# Patient Record
Sex: FEMALE | Race: WHITE | Employment: OTHER | URBAN - METROPOLITAN AREA
[De-identification: names, ages, dates, MRNs, and addresses within clinical notes are randomized per-mention and may not be internally consistent; named-entity substitution may affect disease eponyms.]

---

## 2017-09-07 ENCOUNTER — RADIANT APPOINTMENT (OUTPATIENT)
Dept: MAMMOGRAPHY | Facility: CLINIC | Age: 57
End: 2017-09-07
Attending: NURSE PRACTITIONER
Payer: COMMERCIAL

## 2017-09-07 ENCOUNTER — OFFICE VISIT (OUTPATIENT)
Dept: OBGYN | Facility: CLINIC | Age: 57
End: 2017-09-07
Attending: NURSE PRACTITIONER
Payer: COMMERCIAL

## 2017-09-07 VITALS
BODY MASS INDEX: 28.52 KG/M2 | DIASTOLIC BLOOD PRESSURE: 58 MMHG | HEIGHT: 62 IN | SYSTOLIC BLOOD PRESSURE: 102 MMHG | WEIGHT: 155 LBS

## 2017-09-07 DIAGNOSIS — Z01.419 ENCOUNTER FOR GYNECOLOGICAL EXAMINATION WITHOUT ABNORMAL FINDING: Primary | ICD-10-CM

## 2017-09-07 DIAGNOSIS — Z12.31 VISIT FOR SCREENING MAMMOGRAM: ICD-10-CM

## 2017-09-07 DIAGNOSIS — E55.9 VITAMIN D DEFICIENCY: ICD-10-CM

## 2017-09-07 DIAGNOSIS — N84.1 POLYP AT CERVICAL OS: ICD-10-CM

## 2017-09-07 PROCEDURE — 36415 COLL VENOUS BLD VENIPUNCTURE: CPT | Performed by: NURSE PRACTITIONER

## 2017-09-07 PROCEDURE — G0202 SCR MAMMO BI INCL CAD: HCPCS | Mod: TC | Performed by: OBSTETRICS & GYNECOLOGY

## 2017-09-07 PROCEDURE — G0145 SCR C/V CYTO,THINLAYER,RESCR: HCPCS | Performed by: NURSE PRACTITIONER

## 2017-09-07 PROCEDURE — 82306 VITAMIN D 25 HYDROXY: CPT | Performed by: NURSE PRACTITIONER

## 2017-09-07 PROCEDURE — 99396 PREV VISIT EST AGE 40-64: CPT | Performed by: NURSE PRACTITIONER

## 2017-09-07 PROCEDURE — 88305 TISSUE EXAM BY PATHOLOGIST: CPT | Performed by: NURSE PRACTITIONER

## 2017-09-07 PROCEDURE — 87624 HPV HI-RISK TYP POOLED RSLT: CPT | Performed by: NURSE PRACTITIONER

## 2017-09-07 ASSESSMENT — PATIENT HEALTH QUESTIONNAIRE - PHQ9
SUM OF ALL RESPONSES TO PHQ QUESTIONS 1-9: 0
5. POOR APPETITE OR OVEREATING: NOT AT ALL

## 2017-09-07 ASSESSMENT — ANXIETY QUESTIONNAIRES
3. WORRYING TOO MUCH ABOUT DIFFERENT THINGS: NOT AT ALL
IF YOU CHECKED OFF ANY PROBLEMS ON THIS QUESTIONNAIRE, HOW DIFFICULT HAVE THESE PROBLEMS MADE IT FOR YOU TO DO YOUR WORK, TAKE CARE OF THINGS AT HOME, OR GET ALONG WITH OTHER PEOPLE: NOT DIFFICULT AT ALL
2. NOT BEING ABLE TO STOP OR CONTROL WORRYING: NOT AT ALL
5. BEING SO RESTLESS THAT IT IS HARD TO SIT STILL: NOT AT ALL
1. FEELING NERVOUS, ANXIOUS, OR ON EDGE: NOT AT ALL
GAD7 TOTAL SCORE: 0
6. BECOMING EASILY ANNOYED OR IRRITABLE: NOT AT ALL
7. FEELING AFRAID AS IF SOMETHING AWFUL MIGHT HAPPEN: NOT AT ALL

## 2017-09-07 NOTE — PROGRESS NOTES
Tiffanie is a 57 year old No obstetric history on file. female who presents for annual exam.     Besides routine health maintenance, she has no other health concerns today .    HPI:  The patient doesn't have PCP. Pt here today for her annual exam/mammogram. She is feeling well. No complaints. She is not taking Ambien any longer. She has had a very uneventful year. She has been 1.5 years without a menses. She reports minor hotflashes/night sweats and some vaginal dryness with IC but is managed with sexual lubricant.      GYNECOLOGIC HISTORY:    No LMP recorded. Patient is postmenopausal.  Her current contraception method is: vasectomy.  She  reports that she has never smoked. She has never used smokeless tobacco.      Patient is sexually active.  STD testing offered?  Declined  Last PHQ-9 score on record =   PHQ-9 SCORE 9/7/2017   Total Score 0     Last GAD7 score on record =   CHESTER-7 SCORE 9/7/2017   Total Score 0     Alcohol Score = 4    HEALTH MAINTENANCE:  Cholesterol: 01/15/14   Total= 171, Triglycerides=42, HDL=63, JML=404, TSH=0.58  Last Mammo: 04/15/16, Result: normal, Next Mammo: today   Pap:   Lab Results   Component Value Date    PAP NIL 04/15/2016    PAP NIL 04/08/2015      Colonoscopy:  A year ago @ [facility], Result: normal, Next Colonoscopy: 9 years.  Dexa:  04/08/15    Health maintenance updated:  yes    HISTORY:  Obstetric History     No data available          Patient Active Problem List   Diagnosis     Perimenopausal symptoms     Insomnia     Osteopenia     Past Surgical History:   Procedure Laterality Date     EYE SURGERY Bilateral 2014    lasik     SOFT TISSUE SURGERY Left 1992    lipoma on back      Social History   Substance Use Topics     Smoking status: Never Smoker     Smokeless tobacco: Never Used     Alcohol use Yes      Comment: 3/wk      Problem (# of Occurrences) Relation (Name,Age of Onset)    Prostate Cancer (1) Father            Current Outpatient Prescriptions   Medication Sig      "Cholecalciferol (VITAMIN D3 PO) Take 1,000 Units by mouth daily     No current facility-administered medications for this visit.      No Known Allergies    Past medical, surgical, social and family histories were reviewed and updated in EPIC.    ROS:   12 point review of systems negative other than symptoms noted below.    EXAM:  /58  Ht 5' 1.75\" (1.568 m)  Wt 155 lb (70.3 kg)  BMI 28.58 kg/m2   BMI: Body mass index is 28.58 kg/(m^2).    PHYSICAL EXAM:  Constitutional:  Appearance: Well nourished, well developed, alert, in no acute distress  Neck:  Lymph Nodes:  No lymphadenopathy present    Thyroid:  Gland size normal, nontender, no nodules or masses present  on palpation  Chest:  Respiratory Effort:  Breathing unlabored  Cardiovascular:    Heart: Auscultation:  Regular rate, normal rhythm, no murmurs present  Breasts: Inspection of Breasts:  No lymphadenopathy present., Palpation of Breasts and Axillae:  No masses present on palpation, no breast tenderness., Axillary Lymph Nodes:  No lymphadenopathy present. and No nodularity, asymmetry or nipple discharge bilaterally.  Gastrointestinal:   Abdominal Examination:  Abdomen nontender to palpation, tone normal without rigidity or guarding, no masses present, umbilicus without lesions   Liver and Spleen:  No hepatomegaly present, liver nontender to palpation    Hernias:  No hernias present  Lymphatic: Lymph Nodes:  No other lymphadenopathy present  Skin:  General Inspection:  No rashes present, no lesions present, no areas of  discoloration    Genitalia and Groin:  No rashes present, no lesions present, no areas of  discoloration, no masses present  Neurologic/Psychiatric:    Mental Status:  Oriented X3     Pelvic Exam:  External Genitalia:     Normal appearance for age, no discharge present, no tenderness present, no inflammatory lesions present, color normal  Vagina:     Normal vaginal vault without central or paravaginal defects, no discharge present, no " inflammatory lesions present, no masses present  Bladder:     Nontender to palpation  Urethra:   Urethral Body:  Urethra palpation normal, urethra structural support normal   Urethral Meatus:  No erythema or lesions present  Cervix:     Appearance healthy, SMALL 5MM POLYP present AT OS, nontender to palpation, no bleeding present  Uterus:     Uterus: firm, normal sized and nontender, anteverted in position.   Adnexa:     No adnexal tenderness present, no adnexal masses present  Perineum:     Perineum within normal limits, no evidence of trauma, no rashes or skin lesions present  Anus:     Anus within normal limits, no hemorrhoids present  Inguinal Lymph Nodes:     No lymphadenopathy present  Pubic Hair:     Normal pubic hair distribution for age  Genitalia and Groin:     No rashes present, no lesions present, no areas of discoloration, no masses present      COUNSELING:   Special attention given to:        Regular exercise       Healthy diet/nutrition       Colon cancer screening       (Colleen)menopause management    BMI: Body mass index is 28.58 kg/(m^2).  Weight management plan: Discussed healthy diet and exercise guidelines and patient will follow up in 12 months in clinic to re-evaluate.    ASSESSMENT:  57 year old female with satisfactory annual exam.    ICD-10-CM    1. Encounter for gynecological examination without abnormal finding Z01.419 Pap imaged thin layer screen with HPV - recommended age 30 - 65 years (select HPV order below)     HPV High Risk Types DNA Cervical   2. Vitamin D deficiency E55.9 Vitamin D Deficiency   3. Polyp at cervical os N84.1 Surgical pathology exam       PLAN:  Healthy, normal gyn exam. Supplementing vitamin D-will check lab today. Cervical os polyp removed and sent to pathology.    ALTAGRACIA Winslow CNP  INDICATIONS:                                                      Is a pregnancy test required: No.  Was a consent obtained?  Yes    Today's PHQ-2 Score:   PHQ-2 ( 1999  [employer]) 4/8/2015   Q1: Little interest or pleasure in doing things 0   Q2: Feeling down, depressed or hopeless 0   PHQ-2 Score 0       The patient was advised of the risks, benefits and alternatives of the cervical polyp removal procedure and her questions were answered.      PROCEDURE:                                                      A speculum was placed into the vagina and the cervical polyp was identified. Using ring forceps, the polyp was grasped and removed without difficulty.    POST PROCEDURE:                                                      The polyp was sent to pathology for cytologic analysis.  There was bleeding controlled with pressure. The patient tolerated the procedure well and there were no complications. The patient was discharged to home in stable condition.    Patient advised to call the clinic if heavy bleeding occurs.    ALTAGRACIA Winslow CNP

## 2017-09-07 NOTE — NURSING NOTE
Please abstract the following data from this visit with this patient into the appropriate field in Epic:    Colonoscopy done on this date: 01/01/2016 (approximately), by this group: [facility], results were normal.

## 2017-09-07 NOTE — MR AVS SNAPSHOT
"              After Visit Summary   9/7/2017    Tiffanie Castrejon    MRN: [id number]           Patient Information     Date Of Birth          1960        Visit Information        Provider Department      9/7/2017 8:30 AM Lila Darling APRN CNP [facility]        Today's Diagnoses     Encounter for gynecological examination without abnormal finding    -  1    Vitamin D deficiency        Polyp at cervical os           Follow-ups after your visit        Follow-up notes from your care team     Return in about 1 year (around 9/7/2018).      Who to contact     If you have questions or need follow up information about today's clinic visit or your schedule please contact [facility] directly at [phone].  Normal or non-critical lab and imaging results will be communicated to you by Odersunhart, letter or phone within 4 business days after the clinic has received the results. If you do not hear from us within 7 days, please contact the clinic through Odersunhart or phone. If you have a critical or abnormal lab result, we will notify you by phone as soon as possible.  Submit refill requests through [employer]pex[employer] or call your pharmacy and they will forward the refill request to us. Please allow 3 business days for your refill to be completed.          Additional Information About Your Visit        MyChart Information     Apex[employer] lets you send messages to your doctor, view your test results, renew your prescriptions, schedule appointments and more. To sign up, go to [email or web address] . Click on \"Log in\" on the left side of the screen, which will take you to the Welcome page. Then click on \"Sign up Now\" on the right side of the page.     You will be asked to enter the access code listed below, as well as some personal information. Please follow the directions to create your username and password.     Your access code is: [email or web address]  Expires: 12/6/2017  9:05 AM     Your access code " "will  in 90 days. If you need help or a new code, please call your [place] clinic or [phone].        Care EveryWhere ID     This is your Care EveryWhere ID. This could be used by other organizations to access your [facility] medical records  [id number]        Your Vitals Were     Height BMI (Body Mass Index)                5' 1.75\" (1.568 m) 28.58 kg/m2           Blood Pressure from Last 3 Encounters:   17 102/58   04/15/16 110/70   04/08/15 110/62    Weight from Last 3 Encounters:   17 155 lb (70.3 kg)   04/15/16 154 lb (69.9 kg)   04/08/15 155 lb (70.3 kg)              We Performed the Following     HPV High Risk Types DNA Cervical     Pap imaged thin layer screen with HPV - recommended age 30 - 65 years (select HPV order below)     Surgical pathology exam     Vitamin D Deficiency        Primary Care Provider    None Specified       No primary provider on file.        Equal Access to Services     CHACORTA STEIN : Hadbharti Ford, wananda lynn, qaybta kaalrebeca conroy, traci padilla . So [facility] [phone].    ATENCIÓN: Si hila briana, tiene a gunn disposición servicios gratuitos de asistencia lingüística. Llame al [phone].    We comply with applicable federal civil rights laws and [place] laws. We do not discriminate on the basis of race, color, national origin, age, disability sex, sexual orientation or gender identity.            Thank you!     Thank you for choosing [facility] [place]  for your care. Our goal is always to provide you with excellent care. Hearing back from our patients is one way we can continue to improve our services. Please take a few minutes to complete the written survey that you may receive in the mail after your visit with us. Thank you!             Your Updated Medication List - Protect others around you: Learn how to safely use, store and throw away your medicines at www.disposemymeds.org.        "   This list is accurate as of: 9/7/17  9:05 AM.  Always use your most recent med list.                   Brand Name Dispense Instructions for use Diagnosis    VITAMIN D3 PO      Take 1,000 Units by mouth daily

## 2017-09-08 LAB
COPATH REPORT: NORMAL
DEPRECATED CALCIDIOL+CALCIFEROL SERPL-MC: 31 UG/L (ref 20–75)

## 2017-09-08 ASSESSMENT — ANXIETY QUESTIONNAIRES: GAD7 TOTAL SCORE: 0

## 2017-09-11 LAB
COPATH REPORT: NORMAL
PAP: NORMAL

## 2017-09-12 LAB
FINAL DIAGNOSIS: NORMAL
HPV HR 12 DNA CVX QL NAA+PROBE: NEGATIVE
HPV16 DNA SPEC QL NAA+PROBE: NEGATIVE
HPV18 DNA SPEC QL NAA+PROBE: NEGATIVE
SPECIMEN DESCRIPTION: NORMAL

## 2018-09-13 ENCOUNTER — OFFICE VISIT (OUTPATIENT)
Dept: OBGYN | Facility: CLINIC | Age: 58
End: 2018-09-13
Payer: COMMERCIAL

## 2018-09-13 ENCOUNTER — RADIANT APPOINTMENT (OUTPATIENT)
Dept: MAMMOGRAPHY | Facility: CLINIC | Age: 58
End: 2018-09-13
Payer: COMMERCIAL

## 2018-09-13 VITALS
BODY MASS INDEX: 29.44 KG/M2 | HEIGHT: 62 IN | DIASTOLIC BLOOD PRESSURE: 76 MMHG | SYSTOLIC BLOOD PRESSURE: 110 MMHG | HEART RATE: 72 BPM | WEIGHT: 160 LBS

## 2018-09-13 DIAGNOSIS — Z12.31 VISIT FOR SCREENING MAMMOGRAM: ICD-10-CM

## 2018-09-13 DIAGNOSIS — Z01.419 ENCOUNTER FOR GYNECOLOGICAL EXAMINATION WITHOUT ABNORMAL FINDING: Primary | ICD-10-CM

## 2018-09-13 PROCEDURE — G0145 SCR C/V CYTO,THINLAYER,RESCR: HCPCS | Performed by: NURSE PRACTITIONER

## 2018-09-13 PROCEDURE — 87624 HPV HI-RISK TYP POOLED RSLT: CPT | Performed by: NURSE PRACTITIONER

## 2018-09-13 PROCEDURE — 77067 SCR MAMMO BI INCL CAD: CPT | Mod: TC

## 2018-09-13 PROCEDURE — 99396 PREV VISIT EST AGE 40-64: CPT | Performed by: NURSE PRACTITIONER

## 2018-09-13 ASSESSMENT — ANXIETY QUESTIONNAIRES
GAD7 TOTAL SCORE: 0
2. NOT BEING ABLE TO STOP OR CONTROL WORRYING: NOT AT ALL
7. FEELING AFRAID AS IF SOMETHING AWFUL MIGHT HAPPEN: NOT AT ALL
3. WORRYING TOO MUCH ABOUT DIFFERENT THINGS: NOT AT ALL
5. BEING SO RESTLESS THAT IT IS HARD TO SIT STILL: NOT AT ALL
1. FEELING NERVOUS, ANXIOUS, OR ON EDGE: NOT AT ALL
6. BECOMING EASILY ANNOYED OR IRRITABLE: NOT AT ALL

## 2018-09-13 ASSESSMENT — PATIENT HEALTH QUESTIONNAIRE - PHQ9: 5. POOR APPETITE OR OVEREATING: NOT AT ALL

## 2018-09-13 NOTE — MR AVS SNAPSHOT
"              After Visit Summary   2018    Tiffanie Castrejon    MRN: [id number]           Patient Information     Date Of Birth          1960        Visit Information        Provider Department      2018 7:30 AM Lila Darling APRN CNP [facility] [place]        Today's Diagnoses     Encounter for gynecological examination without abnormal finding    -  1       Follow-ups after your visit        Follow-up notes from your care team     Return in about 1 year (around 2019).      Who to contact     If you have questions or need follow up information about today's clinic visit or your schedule please contact [facility] directly at [phone].  Normal or non-critical lab and imaging results will be communicated to you by MyChart, letter or phone within 4 business days after the clinic has received the results. If you do not hear from us within 7 days, please contact the clinic through MyChart or phone. If you have a critical or abnormal lab result, we will notify you by phone as soon as possible.  Submit refill requests through [employer] or call your pharmacy and they will forward the refill request to us. Please allow 3 business days for your refill to be completed.          Additional Information About Your Visit        MyChart Information     [employer] lets you send messages to your doctor, view your test results, renew your prescriptions, schedule appointments and more. To sign up, go to [email or web address] . Click on \"Log in\" on the left side of the screen, which will take you to the Welcome page. Then click on \"Sign up Now\" on the right side of the page.     You will be asked to enter the access code listed below, as well as some personal information. Please follow the directions to create your username and password.     Your access code is: [email or web address]  Expires: 2018  8:12 AM     Your access code will  in 90 days. If you need help or a new " "code, please call your [place] clinic or [phone].        Care EveryWhere ID     This is your Care EveryWhere ID. This could be used by other organizations to access your [facility] medical records  [id number]        Your Vitals Were     Pulse Height BMI (Body Mass Index)             72 5' 2\" (1.575 m) 29.26 kg/m2          Blood Pressure from Last 3 Encounters:   09/13/18 110/76   09/07/17 102/58   04/15/16 110/70    Weight from Last 3 Encounters:   09/13/18 160 lb (72.6 kg)   09/07/17 155 lb (70.3 kg)   04/15/16 154 lb (69.9 kg)              We Performed the Following     HPV High Risk Types DNA Cervical     Pap imaged thin layer screen with HPV - recommended age 30 - 65        Primary Care Provider    None Specified       No primary provider on file.        Equal Access to Services     CHACORTA STEIN : Mihir Ford, samson lynn, cristina conroy, traci padilla . So [facility] [phone].    ATENCIÓN: Si habla español, tiene a ugnn disposición servicios gratuitos de asistencia lingüística. Llame al [phone].    We comply with applicable federal civil rights laws and [place] laws. We do not discriminate on the basis of race, color, national origin, age, disability, sex, sexual orientation, or gender identity.            Thank you!     Thank you for choosing [facility] [place]  for your care. Our goal is always to provide you with excellent care. Hearing back from our patients is one way we can continue to improve our services. Please take a few minutes to complete the written survey that you may receive in the mail after your visit with us. Thank you!             Your Updated Medication List - Protect others around you: Learn how to safely use, store and throw away your medicines at www.disposemymeds.org.          This list is accurate as of 9/13/18  8:12 AM.  Always use your most recent med list.                   Brand Name Dispense " Instructions for use Diagnosis    VITAMIN D3 PO      Take 1,000 Units by mouth daily

## 2018-09-13 NOTE — LETTER
September 21, 2018    Tiffanie Castrejon  [address] [place] [id number]    Dear Tiffanie,  We are happy to inform you that your PAP smear result from 9/13/18 is normal.  We are now able to do a follow up test on PAP smears. The DNA test is for HPV (Human Papilloma Virus). Cervical cancer is closely linked with certain types of HPV. Your results showed no evidence of high risk HPV.  Therefore we recommend you return in 3 years for your next pap smear.  You will still need to return to the clinic every year for an annual exam and other preventive tests.  Please contact the clinic at [phone] with any questions.  Sincerely,    ALTAGRACIA Winslow CNP/  Michelle Schafer, RN, BSN  Pap Tracking

## 2018-09-13 NOTE — PROGRESS NOTES
Tiffanie is a 58 year old No obstetric history on file. female who presents for annual exam.     Besides routine health maintenance, she has no other health concerns today .    HPI:  The patient does not use a PCP.  Pt here today for her annual exam. She is feeling well.   She is concerned about some weight gain.   She has her mammogram today.  Minimal hotflashes/night sweats. No vaginal bleeding.   Using lubricants for vaginal dryness.      GYNECOLOGIC HISTORY:    No LMP recorded. Patient is postmenopausal.  Her current contraception method is: menopause.  She  reports that she has never smoked. She has never used smokeless tobacco.    Patient is sexually active.  STD testing offered?  Declined  Last PHQ-9 score on record =   PHQ-9 SCORE 9/13/2018   Total Score 0     Last GAD7 score on record =   CHESTER-7 SCORE 9/13/2018   Total Score 0     Alcohol Score = 3    HEALTH MAINTENANCE:  Cholesterol: 1/15/14   Total= 171, Triglycerides=42, HDL=63, VYI=526  Cholesterol   Date Value Ref Range Status   01/15/2014 171 115 - 199 mg/dL Final      Last Mammo: one year ago, Result: normal, Next Mammo: today   Pap:   Lab Results   Component Value Date    PAP NILHPV- 09/07/2017    PAP NIL 04/15/2016    PAP NIL 04/08/2015      Colonoscopy:  1/1/16, Result: normal, Next Colonoscopy: 8 years.  Dexa:  2015    Health maintenance updated:  yes    HISTORY:  Obstetric History     No data available          Patient Active Problem List   Diagnosis     Perimenopausal symptoms     Insomnia     Osteopenia     Past Surgical History:   Procedure Laterality Date     EYE SURGERY Bilateral 2014    lasik     SOFT TISSUE SURGERY Left 1992    lipoma on back      Social History   Substance Use Topics     Smoking status: Never Smoker     Smokeless tobacco: Never Used     Alcohol use Yes      Comment: 3/wk      Problem (# of Occurrences) Relation (Name,Age of Onset)    Prostate Cancer (1) Father            Current Outpatient Prescriptions   Medication Sig  "    Cholecalciferol (VITAMIN D3 PO) Take 1,000 Units by mouth daily     No current facility-administered medications for this visit.      No Known Allergies    Past medical, surgical, social and family histories were reviewed and updated in EPIC.    ROS:   12 point review of systems negative other than symptoms noted below.  Genitourinary: Vaginal Dryness  Endocrine: Decreased Libido    EXAM:  /76  Pulse 72  Ht 5' 2\" (1.575 m)  Wt 160 lb (72.6 kg)  BMI 29.26 kg/m2   BMI: Body mass index is 29.26 kg/(m^2).    PHYSICAL EXAM:  Constitutional:  Appearance: Well nourished, well developed, alert, in no acute distress  Neck:  Lymph Nodes:  No lymphadenopathy present    Thyroid:  Gland size normal, nontender, no nodules or masses present  on palpation  Chest:  Respiratory Effort:  Breathing unlabored  Cardiovascular:    Heart: Auscultation:  Regular rate, normal rhythm, no murmurs present  Breasts: Inspection of Breasts:  No lymphadenopathy present., Palpation of Breasts and Axillae:  No masses present on palpation, no breast tenderness., Axillary Lymph Nodes:  No lymphadenopathy present. and No nodularity, asymmetry or nipple discharge bilaterally.  Gastrointestinal:   Abdominal Examination:  Abdomen nontender to palpation, tone normal without rigidity or guarding, no masses present, umbilicus without lesions   Liver and Spleen:  No hepatomegaly present, liver nontender to palpation    Hernias:  No hernias present  Lymphatic: Lymph Nodes:  No other lymphadenopathy present  Skin:  General Inspection:  No rashes present, no lesions present, no areas of  discoloration    Genitalia and Groin:  No rashes present, no lesions present, no areas of  discoloration, no masses present  Neurologic/Psychiatric:    Mental Status:  Oriented X3     Pelvic Exam:  External Genitalia:     Normal appearance for age, no discharge present, no tenderness present, no inflammatory lesions present, color normal  Vagina:     Normal vaginal " vault without central or paravaginal defects, no discharge present, no inflammatory lesions present, no masses present  Bladder:     Nontender to palpation  Urethra:   Urethral Body:  Urethra palpation normal, urethra structural support normal   Urethral Meatus:  No erythema or lesions present  Cervix:     Appearance healthy, no lesions present, nontender to palpation, no bleeding present  Uterus:     Uterus: firm, normal sized and nontender, anteverted in position.   Adnexa:     No adnexal tenderness present, no adnexal masses present  Perineum:     Perineum within normal limits, no evidence of trauma, no rashes or skin lesions present  Anus:     Anus within normal limits, no hemorrhoids present  Inguinal Lymph Nodes:     No lymphadenopathy present  Pubic Hair:     Normal pubic hair distribution for age  Genitalia and Groin:     No rashes present, no lesions present, no areas of discoloration, no masses present      COUNSELING:   Special attention given to:        Regular exercise       Healthy diet/nutrition       Colon cancer screening       (Colleen)menopause management    BMI: Body mass index is 29.26 kg/(m^2).  Weight management plan: Discussed healthy diet and exercise guidelines and patient will follow up in 12 months in clinic to re-evaluate.    ASSESSMENT:  58 year old female with satisfactory annual exam.    ICD-10-CM    1. Encounter for gynecological examination without abnormal finding Z01.419 Pap imaged thin layer screen with HPV - recommended age 30 - 65     HPV High Risk Types DNA Cervical       PLAN:  Overweight female. Discussed weight watchers program. Pap guidelines discussed. No hx of abnormal. Will complete today then space out every 3 years if today's pap is normal.     ALTAGRACIA Winslow CNP

## 2018-09-14 ASSESSMENT — PATIENT HEALTH QUESTIONNAIRE - PHQ9: SUM OF ALL RESPONSES TO PHQ QUESTIONS 1-9: 0

## 2018-09-14 ASSESSMENT — ANXIETY QUESTIONNAIRES: GAD7 TOTAL SCORE: 0

## 2018-09-17 LAB
COPATH REPORT: NORMAL
PAP: NORMAL

## 2018-09-19 LAB
FINAL DIAGNOSIS: NORMAL
HPV HR 12 DNA CVX QL NAA+PROBE: NEGATIVE
HPV16 DNA SPEC QL NAA+PROBE: NEGATIVE
HPV18 DNA SPEC QL NAA+PROBE: NEGATIVE
SPECIMEN DESCRIPTION: NORMAL
SPECIMEN SOURCE CVX/VAG CYTO: NORMAL

## 2018-11-16 ENCOUNTER — TELEPHONE (OUTPATIENT)
Dept: OBGYN | Facility: CLINIC | Age: 58
End: 2018-11-16

## 2018-11-16 NOTE — TELEPHONE ENCOUNTER
We can see if [employer] screens for the genes associated with Inessa Danlos Syndrome if Tiffanie her self want's to be tested or if her daughter wants testing.     Some genes associated with the syndrome are   COL1A1, COL1A2, COL3A1, COL5A1, and COL5A2    We can facilitate testing if they wish. If [employer] doesn't screen for theses, then we would refer to genetic counselor for further workup.

## 2018-11-16 NOTE — TELEPHONE ENCOUNTER
Patient requesting call from Lila RAYGOZA to discuss genetic testing for Inessa Danlo Syndrome, her daughter was recently diagnosed.

## 2018-11-16 NOTE — TELEPHONE ENCOUNTER
Contacted the pt-Her daughter had been clinically dx by 2 different drs. Has not had any genetic testing. The pt is interested in getting tested herself to find out if she is the carrier of the gene.  Pt informed that [facility] and spoke to a genetic counselor and she recommended that the pt remain with the same lab as her daughter- [facility] only checks for one gene as where other labs do more extensive testing.  Also told the pt that we will send a referral to where she would like to be seen at. Suggested that she investigate where her insurance covers the testing. In the past patients have been referred to mercedes Espinoza at the [facility] in [facility]- # [phone].   Pt said that she will contact [facility] to find out her coverage.

## 2019-05-30 ENCOUNTER — OFFICE VISIT (OUTPATIENT)
Dept: OBGYN | Facility: CLINIC | Age: 59
End: 2019-05-30
Payer: COMMERCIAL

## 2019-05-30 VITALS — DIASTOLIC BLOOD PRESSURE: 72 MMHG | BODY MASS INDEX: 27.55 KG/M2 | SYSTOLIC BLOOD PRESSURE: 108 MMHG | WEIGHT: 150.6 LBS

## 2019-05-30 DIAGNOSIS — N64.4 BREAST PAIN: Primary | ICD-10-CM

## 2019-05-30 PROCEDURE — 99214 OFFICE O/P EST MOD 30 MIN: CPT | Performed by: OBSTETRICS & GYNECOLOGY

## 2019-05-30 NOTE — PROGRESS NOTES
SUBJECTIVE:                                                   Tiffanie Castrejon is a 59 year old female who presents to clinic today for the following health issue(s):  Patient presents with:  Breast Pain: Patient states that she has been having left breast pain off and on for several months now.       HPI:  Has always has sensitive breasts, L>R, affected by caffeine.Over last few months more pain in left breast. Though has not drank more lately. Sensitivity is on outer left, dull ache all the time, occ increases. Not aware of triggers. No rash/lumps/discharge. No trauma. No new activity. No medication changes.   Has addressed this in visits in past, but wanted to get checked out before Sept.     No hx HRT. No breast or related cancers in family    Last mammo sept. Never had abnormal.     LMP uncertain, many years ago.     Review of PMH, SocHx, SurHx, FHx, medications completed. Epic updated.      No LMP recorded. Patient is postmenopausal..   Patient is sexually active, .  Using vasectomy for contraception.    reports that she has never smoked. She has never used smokeless tobacco.    STD testing offered?  Declined    Health maintenance updated:  yes    Today's PHQ-2 Score:   PHQ-2 (  [employer]) 2015   Q1: Little interest or pleasure in doing things 0   Q2: Feeling down, depressed or hopeless 0   PHQ-2 Score 0     Today's PHQ-9 Score:   PHQ-9 SCORE 2018   PHQ-9 Total Score 0     Today's CHESTER-7 Score:   CHESTER-7 SCORE 2018   Total Score 0       Problem list and histories reviewed & adjusted, as indicated.  Additional history: as documented.    Patient Active Problem List   Diagnosis     Perimenopausal symptoms     Insomnia     Osteopenia     Past Surgical History:   Procedure Laterality Date     EYE SURGERY Bilateral     lasik     SOFT TISSUE SURGERY Left     lipoma on back      Social History     Tobacco Use     Smoking status: Never Smoker     Smokeless tobacco: Never Used   Substance  Use Topics     Alcohol use: Yes     Comment: 3/wk      Problem (# of Occurrences) Relation (Name,Age of Onset)    Prostate Cancer (1) Father    Squamous cell carcinoma (1) Mother (88)            Current Outpatient Medications   Medication Sig     Cholecalciferol (VITAMIN D3 PO) Take 1,000 Units by mouth daily     No current facility-administered medications for this visit.      No Known Allergies    ROS:  12 point review of systems negative other than symptoms noted below.  Breast: Tenderness    OBJECTIVE:     /72   Wt 68.3 kg (150 lb 9.6 oz)   Breastfeeding? No   BMI 27.55 kg/m    Body mass index is 27.55 kg/m .    Exam:  Constitutional:  Appearance: Well nourished, well developed alert, in no acute distress  Chest:  Respiratory Effort:  Breathing unlabored  Breasts:  Inspection of Breasts:  No lymphadenopathy present; Palpation of Breasts and Axillae: TENDERNESS PRESENT ON UPPER AND OUTER BREAST QUADRANTS, No masses present on palpation on right, LEFT WITH TWO SMALL SPONGY FIBROUS NODULES AT 12 AND 3 AND ARE NOT TENDER TO PT, no breast tenderness Axillary Lymph Nodes:  No lymphadenopathy present  Lymphatic: Lymph Nodes:  No other lymphadenopathy present  Neurologic/Psychiatric:  Mental Status:  Oriented X3        ASSESSMENT/PLAN:                                                        ICD-10-CM    1. Breast pain N64.4 MA Diagnostic Left w/Jamil       -Has hx of breast pain, suspect this is just exacerbation or increased concern of a long standing condition. Small fibrocystic changes palpated are of low concerns. Will get diagnostic imaging of left breast. Will be due for screen of right in Sept. Pt happy with plan. [facility], may see if can be done closer to her work in [place].    Sakina Case Masters, DO  [facility] [place]

## 2019-06-13 ENCOUNTER — HOSPITAL ENCOUNTER (OUTPATIENT)
Dept: MAMMOGRAPHY | Facility: CLINIC | Age: 59
End: 2019-06-13
Attending: OBSTETRICS & GYNECOLOGY
Payer: COMMERCIAL

## 2019-06-13 ENCOUNTER — HOSPITAL ENCOUNTER (OUTPATIENT)
Dept: MAMMOGRAPHY | Facility: CLINIC | Age: 59
Discharge: HOME | End: 2019-06-13
Attending: OBSTETRICS & GYNECOLOGY | Admitting: OBSTETRICS & GYNECOLOGY
Payer: COMMERCIAL

## 2019-06-13 DIAGNOSIS — N64.4 BREAST PAIN: ICD-10-CM

## 2019-06-13 PROCEDURE — G0279 TOMOSYNTHESIS, MAMMO: HCPCS

## 2019-06-13 PROCEDURE — 76642 ULTRASOUND BREAST LIMITED: CPT | Mod: LT

## 2019-08-02 ENCOUNTER — TRANSFERRED RECORDS (OUTPATIENT)
Dept: HEALTH INFORMATION MANAGEMENT | Facility: CLINIC | Age: 59
End: 2019-08-02

## 2020-03-10 ENCOUNTER — HEALTH MAINTENANCE LETTER (OUTPATIENT)
Age: 60
End: 2020-03-10

## 2020-08-19 NOTE — PROGRESS NOTES
Tiffanie is a 60 year old  female who presents for annual exam.     Besides routine health maintenance, she has no other health concerns today .    HPI:  The patient's PCP is  No primary care provider on file..  Pt here today for her annual gyn exam. She is feeling well. Due for dexa this year. Last in .     Mammogram in 2020, will plan dexa same day.     No vag bleeding. No vasomotor symptoms. Using lubricants with IC.     Recently retired. Joined a few non [employer].     Still working on her weight    Due for shingles vaccine.     Her only concern is some rectal bleeding when she has a hard stool.      GYNECOLOGIC HISTORY:    No LMP recorded. Patient is postmenopausal.  Her current contraception method is: vasectomy and menopause.  She  reports that she has never smoked. She has never used smokeless tobacco.    Patient is sexually active.  STD testing offered?  Declined  Last PHQ-9 score on record =   PHQ-9 SCORE 2020   PHQ-9 Total Score 0     Last GAD7 score on record =   CHESTER-7 SCORE 2020   Total Score 0     Alcohol Score = 4    HEALTH MAINTENANCE:  Cholesterol:   Recent Labs   Lab Test 01/15/14   CHOL 171   HDL 63      TRIG 42     Last Mammo: One year ago, Result: Normal, Next Mammo: Due   Pap:   Lab Results   Component Value Date    PAP NIL, HPV- 2018    PAP NIL 2017    PAP NIL 04/15/2016      Colonoscopy:  2016, Result: Normal, Next Colonoscopy: 6 years.  Dexa:  2015  FINDINGS:  Lumbar Spine (L1-L4) Z-score: 1.0  Dual Femur: -0.2      Lumbar (L1-L4) BMD: 1.323   Total Hip Mean BMD: 0.987    Health maintenance updated:  yes    HISTORY:  OB History    Para Term  AB Living   3 0 0 0 0 3   SAB TAB Ectopic Multiple Live Births   0 0 0 0 3      # Outcome Date GA Lbr Ino/2nd Weight Sex Delivery Anes PTL Lv   3             2             1                 Patient Active Problem List   Diagnosis     Perimenopausal symptoms  "    Insomnia     Osteopenia     Past Surgical History:   Procedure Laterality Date     EYE SURGERY Bilateral 2014    lasik     SOFT TISSUE SURGERY Left 1992    lipoma on back      Social History     Tobacco Use     Smoking status: Never Smoker     Smokeless tobacco: Never Used   Substance Use Topics     Alcohol use: Yes     Comment: 3/wk      Problem (# of Occurrences) Relation (Name,Age of Onset)    Prostate Cancer (1) Father    Squamous cell carcinoma (1) Mother (88)            Current Outpatient Medications   Medication Sig     Cholecalciferol (VITAMIN D3 PO) Take 1,000 Units by mouth daily     No current facility-administered medications for this visit.      No Known Allergies    Past medical, surgical, social and family histories were reviewed and updated in EPIC.    ROS:   12 point review of systems negative other than symptoms noted below or in the HPI.  No urinary frequency or dysuria, bladder or kidney problems    EXAM:  /62   Pulse 68   Ht 1.575 m (5' 2\")   Wt 71.9 kg (158 lb 9.6 oz)   Breastfeeding No   BMI 29.01 kg/m     BMI: Body mass index is 29.01 kg/m .    PHYSICAL EXAM:  Constitutional:   Appearance: Well nourished, well developed, alert, in no acute distress  Neck:  Lymph Nodes:  No lymphadenopathy present    Thyroid:  Gland size normal, nontender, no nodules or masses present  on palpation  Chest:  Respiratory Effort:  Breathing unlabored  Cardiovascular:    Heart: Auscultation:  Regular rate, normal rhythm, no murmurs present  Breasts: Inspection of Breasts:  No lymphadenopathy present., Palpation of Breasts and Axillae:  No masses present on palpation, no breast tenderness., Axillary Lymph Nodes:  No lymphadenopathy present. and No nodularity, asymmetry or nipple discharge bilaterally.  Gastrointestinal:   Abdominal Examination:  Abdomen nontender to palpation, tone normal without rigidity or guarding, no masses present, umbilicus without lesions   Liver and Spleen:  No hepatomegaly " present, liver nontender to palpation    Hernias:  No hernias present  Lymphatic: Lymph Nodes:  No other lymphadenopathy present  Skin:  General Inspection:  No rashes present, no lesions present, no areas of  discoloration  Neurologic:    Mental Status:  Oriented X3.  Normal strength and tone, sensory exam                grossly normal, mentation intact and speech normal.    Psychiatric:   Mentation appears normal and affect normal/bright.         Pelvic Exam:  External Genitalia:     Normal appearance for age, no discharge present, no tenderness present, no inflammatory lesions present, color normal  Vagina:     Normal vaginal vault without central or paravaginal defects, no discharge present, no inflammatory lesions present, no masses present  Bladder:     Nontender to palpation  Urethra:   Urethral Body:  Urethra palpation normal, urethra structural support normal   Urethral Meatus:  No erythema or lesions present  Cervix:     Appearance healthy, no lesions present, nontender to palpation, no bleeding present  Uterus:     Uterus: firm, normal sized and nontender, anteverted in position.   Adnexa:     No adnexal tenderness present, no adnexal masses present  Perineum:     Perineum within normal limits, no evidence of trauma, no rashes or skin lesions present  Anus:     Anus within normal limits, no hemorrhoids present  Inguinal Lymph Nodes:     No lymphadenopathy present  Pubic Hair:     Normal pubic hair distribution for age  Genitalia and Groin:     No rashes present, no lesions present, no areas of discoloration, no masses present      COUNSELING:   Special attention given to:        Regular exercise       Healthy diet/nutrition       Osteoporosis Prevention/Bone Health       Colon cancer screening       (Colleen)menopause management    BMI: Body mass index is 29.01 kg/m .  Weight management plan: Discussed healthy diet and exercise guidelines    ASSESSMENT:  60 year old female with satisfactory annual exam.     ICD-10-CM    1. Encounter for gynecological examination without abnormal finding  Z01.419    2. Need for shingles vaccine  Z23 ZOSTER VACCINE RECOMBINANT ADJUVANTED IM NJX     VACCINE ADMINISTRATION, INITIAL   3. Osteopenia, unspecified location  M85.80 DX Hip/Pelvis/Spine       PLAN:  Normal gyn exam. Pap due in 2021. Shingles vaccine given. dexa this fall.     ALTAGRACIA Winslow CNP

## 2020-08-20 ENCOUNTER — OFFICE VISIT (OUTPATIENT)
Dept: OBGYN | Facility: CLINIC | Age: 60
End: 2020-08-20
Payer: COMMERCIAL

## 2020-08-20 VITALS
HEIGHT: 62 IN | BODY MASS INDEX: 29.19 KG/M2 | WEIGHT: 158.6 LBS | SYSTOLIC BLOOD PRESSURE: 100 MMHG | HEART RATE: 68 BPM | DIASTOLIC BLOOD PRESSURE: 62 MMHG

## 2020-08-20 DIAGNOSIS — Z23 NEED FOR SHINGLES VACCINE: ICD-10-CM

## 2020-08-20 DIAGNOSIS — Z01.419 ENCOUNTER FOR GYNECOLOGICAL EXAMINATION WITHOUT ABNORMAL FINDING: Primary | ICD-10-CM

## 2020-08-20 DIAGNOSIS — M85.80 OSTEOPENIA, UNSPECIFIED LOCATION: ICD-10-CM

## 2020-08-20 PROCEDURE — 90471 IMMUNIZATION ADMIN: CPT | Performed by: NURSE PRACTITIONER

## 2020-08-20 PROCEDURE — 99396 PREV VISIT EST AGE 40-64: CPT | Mod: 25 | Performed by: NURSE PRACTITIONER

## 2020-08-20 PROCEDURE — 90750 HZV VACC RECOMBINANT IM: CPT | Performed by: NURSE PRACTITIONER

## 2020-08-20 ASSESSMENT — ANXIETY QUESTIONNAIRES
3. WORRYING TOO MUCH ABOUT DIFFERENT THINGS: NOT AT ALL
IF YOU CHECKED OFF ANY PROBLEMS ON THIS QUESTIONNAIRE, HOW DIFFICULT HAVE THESE PROBLEMS MADE IT FOR YOU TO DO YOUR WORK, TAKE CARE OF THINGS AT HOME, OR GET ALONG WITH OTHER PEOPLE: NOT DIFFICULT AT ALL
7. FEELING AFRAID AS IF SOMETHING AWFUL MIGHT HAPPEN: NOT AT ALL
2. NOT BEING ABLE TO STOP OR CONTROL WORRYING: NOT AT ALL
5. BEING SO RESTLESS THAT IT IS HARD TO SIT STILL: NOT AT ALL
6. BECOMING EASILY ANNOYED OR IRRITABLE: NOT AT ALL
GAD7 TOTAL SCORE: 0
1. FEELING NERVOUS, ANXIOUS, OR ON EDGE: NOT AT ALL

## 2020-08-20 ASSESSMENT — MIFFLIN-ST. JEOR: SCORE: 1242.65

## 2020-08-20 ASSESSMENT — PATIENT HEALTH QUESTIONNAIRE - PHQ9
SUM OF ALL RESPONSES TO PHQ QUESTIONS 1-9: 0
5. POOR APPETITE OR OVEREATING: NOT AT ALL

## 2020-08-20 NOTE — NURSING NOTE
Prior to immunization administration, verified patients identity using patient s name and date of birth. Please see Immunization Activity for additional information.     Screening Questionnaire for Adult Immunization    Are you sick today?   No   Do you have allergies to medications, food, a vaccine component or latex?   No   Have you ever had a serious reaction after receiving a vaccination?   No   Do you have a long-term health problem with heart, lung, kidney, or metabolic disease (e.g., diabetes), asthma, a blood disorder, no spleen, complement component deficiency, a cochlear implant, or a spinal fluid leak?  Are you on long-term aspirin therapy?   No   Do you have cancer, leukemia, HIV/AIDS, or any other immune system problem?   No   Do you have a parent, brother, or sister with an immune system problem?   No   In the past 3 months, have you taken medications that affect  your immune system, such as prednisone, other steroids, or anticancer drugs; drugs for the treatment of rheumatoid arthritis, Crohn s disease, or psoriasis; or have you had radiation treatments?   No   Have you had a seizure, or a brain or other nervous system problem?   No   During the past year, have you received a transfusion of blood or blood    products, or been given immune (gamma) globulin or antiviral drug?   No   For women: Are you pregnant or is there a chance you could become       pregnant during the next month?   No   Have you received any vaccinations in the past 4 weeks?   No     Immunization questionnaire answers were all negative.        Per orders of Lila Darling, injection of Shingrix given by Sakina Cho MA. Patient instructed to remain in clinic for 15 minutes afterwards, and to report any adverse reaction to me immediately.       Screening performed by Sakina Cho MA on 8/20/2020 at 8:05 AM.

## 2020-08-21 ASSESSMENT — ANXIETY QUESTIONNAIRES: GAD7 TOTAL SCORE: 0

## 2020-10-13 DIAGNOSIS — Z23 NEED FOR SHINGLES VACCINE: Primary | ICD-10-CM

## 2020-10-21 ENCOUNTER — ANCILLARY PROCEDURE (OUTPATIENT)
Dept: MAMMOGRAPHY | Facility: CLINIC | Age: 60
End: 2020-10-21
Payer: COMMERCIAL

## 2020-10-21 ENCOUNTER — ALLIED HEALTH/NURSE VISIT (OUTPATIENT)
Dept: NURSING | Facility: CLINIC | Age: 60
End: 2020-10-21
Payer: COMMERCIAL

## 2020-10-21 ENCOUNTER — ANCILLARY PROCEDURE (OUTPATIENT)
Dept: BONE DENSITY | Facility: CLINIC | Age: 60
End: 2020-10-21
Attending: NURSE PRACTITIONER
Payer: COMMERCIAL

## 2020-10-21 DIAGNOSIS — M85.80 OSTEOPENIA, UNSPECIFIED LOCATION: ICD-10-CM

## 2020-10-21 DIAGNOSIS — Z23 NEED FOR SHINGLES VACCINE: ICD-10-CM

## 2020-10-21 DIAGNOSIS — Z12.31 VISIT FOR SCREENING MAMMOGRAM: ICD-10-CM

## 2020-10-21 PROCEDURE — 90471 IMMUNIZATION ADMIN: CPT

## 2020-10-21 PROCEDURE — 77080 DXA BONE DENSITY AXIAL: CPT | Performed by: OBSTETRICS & GYNECOLOGY

## 2020-10-21 PROCEDURE — 99207 PR NO CHARGE NURSE ONLY: CPT

## 2020-10-21 PROCEDURE — 90750 HZV VACC RECOMBINANT IM: CPT

## 2020-10-21 PROCEDURE — 77067 SCR MAMMO BI INCL CAD: CPT | Performed by: RADIOLOGY

## 2020-10-21 NOTE — PROGRESS NOTES
Prior to immunization administration, verified patients identity using patient s name and date of birth. Please see Immunization Activity for additional information.     Screening Questionnaire for Adult Immunization    Are you sick today?   No   Do you have allergies to medications, food, a vaccine component or latex?   No   Have you ever had a serious reaction after receiving a vaccination?   No   Do you have a long-term health problem with heart, lung, kidney, or metabolic disease (e.g., diabetes), asthma, a blood disorder, no spleen, complement component deficiency, a cochlear implant, or a spinal fluid leak?  Are you on long-term aspirin therapy?   No   Do you have cancer, leukemia, HIV/AIDS, or any other immune system problem?   No   Do you have a parent, brother, or sister with an immune system problem?   No   In the past 3 months, have you taken medications that affect  your immune system, such as prednisone, other steroids, or anticancer drugs; drugs for the treatment of rheumatoid arthritis, Crohn s disease, or psoriasis; or have you had radiation treatments?   No   Have you had a seizure, or a brain or other nervous system problem?   No   During the past year, have you received a transfusion of blood or blood    products, or been given immune (gamma) globulin or antiviral drug?   No   For women: Are you pregnant or is there a chance you could become       pregnant during the next month?   No   Have you received any vaccinations in the past 4 weeks?   No     Immunization questionnaire answers were all negative.        Per orders of Lila Darling, injection of Shingrix given by Norma Lackey CMA. Patient instructed to remain in clinic for 15 minutes afterwards, and to report any adverse reaction to me immediately.       Screening performed by Norma Lackey CMA on 10/21/2020 at 9:19 AM.

## 2020-10-23 ENCOUNTER — TELEPHONE (OUTPATIENT)
Dept: OBGYN | Facility: CLINIC | Age: 60
End: 2020-10-23

## 2020-10-23 NOTE — TELEPHONE ENCOUNTER
Called pt and informed her Lila Darling NP is out of the office for the day and returns on Tuesday.  Pt is willing to wait for her return and will expect a call regarding the results from her recent Dexa scan. She never spoke with Dr Stevens previously regarding results.    Routing to ADOLFO Darling to return call.    Lila Saldaña RN on 10/23/2020 at 4:06 PM

## 2020-10-23 NOTE — TELEPHONE ENCOUNTER
Patient said she was supposed to get a call back from Lila to over her Dexa results. In the Dexa results notes it states that patient was given results by Dr. Stevens, but patient said she never discussed with Dr. Stevens. She would like a call back from Lila.

## 2021-10-03 ENCOUNTER — HEALTH MAINTENANCE LETTER (OUTPATIENT)
Age: 61
End: 2021-10-03

## 2021-11-09 ENCOUNTER — OFFICE VISIT (OUTPATIENT)
Dept: OBGYN | Facility: CLINIC | Age: 61
End: 2021-11-09
Payer: COMMERCIAL

## 2021-11-09 VITALS
SYSTOLIC BLOOD PRESSURE: 104 MMHG | WEIGHT: 159.6 LBS | BODY MASS INDEX: 29.37 KG/M2 | DIASTOLIC BLOOD PRESSURE: 64 MMHG | HEART RATE: 62 BPM | HEIGHT: 62 IN

## 2021-11-09 DIAGNOSIS — Z13.21 ENCOUNTER FOR VITAMIN DEFICIENCY SCREENING: ICD-10-CM

## 2021-11-09 DIAGNOSIS — Z01.419 ENCOUNTER FOR GYNECOLOGICAL EXAMINATION WITHOUT ABNORMAL FINDING: Primary | ICD-10-CM

## 2021-11-09 DIAGNOSIS — Z13.29 SCREENING FOR THYROID DISORDER: ICD-10-CM

## 2021-11-09 DIAGNOSIS — Z80.41 FAMILY HISTORY OF MALIGNANT NEOPLASM OF OVARY IN FIRST DEGREE RELATIVE: ICD-10-CM

## 2021-11-09 DIAGNOSIS — Z13.0 SCREENING FOR DISORDER OF BLOOD AND BLOOD-FORMING ORGANS: ICD-10-CM

## 2021-11-09 DIAGNOSIS — Z13.6 SCREENING FOR CARDIOVASCULAR CONDITION: ICD-10-CM

## 2021-11-09 DIAGNOSIS — Z13.1 SCREENING FOR DIABETES MELLITUS: ICD-10-CM

## 2021-11-09 PROCEDURE — 99396 PREV VISIT EST AGE 40-64: CPT | Performed by: NURSE PRACTITIONER

## 2021-11-09 PROCEDURE — G0145 SCR C/V CYTO,THINLAYER,RESCR: HCPCS | Performed by: NURSE PRACTITIONER

## 2021-11-09 PROCEDURE — 87624 HPV HI-RISK TYP POOLED RSLT: CPT | Performed by: NURSE PRACTITIONER

## 2021-11-09 RX ORDER — CALCIUM CARBONATE 500(1250)
1 TABLET ORAL 2 TIMES DAILY
COMMUNITY

## 2021-11-09 ASSESSMENT — PATIENT HEALTH QUESTIONNAIRE - PHQ9: 5. POOR APPETITE OR OVEREATING: NOT AT ALL

## 2021-11-09 ASSESSMENT — ANXIETY QUESTIONNAIRES
6. BECOMING EASILY ANNOYED OR IRRITABLE: NOT AT ALL
5. BEING SO RESTLESS THAT IT IS HARD TO SIT STILL: NOT AT ALL
7. FEELING AFRAID AS IF SOMETHING AWFUL MIGHT HAPPEN: NOT AT ALL
1. FEELING NERVOUS, ANXIOUS, OR ON EDGE: NOT AT ALL
2. NOT BEING ABLE TO STOP OR CONTROL WORRYING: NOT AT ALL
IF YOU CHECKED OFF ANY PROBLEMS ON THIS QUESTIONNAIRE, HOW DIFFICULT HAVE THESE PROBLEMS MADE IT FOR YOU TO DO YOUR WORK, TAKE CARE OF THINGS AT HOME, OR GET ALONG WITH OTHER PEOPLE: NOT DIFFICULT AT ALL
3. WORRYING TOO MUCH ABOUT DIFFERENT THINGS: NOT AT ALL
GAD7 TOTAL SCORE: 0

## 2021-11-09 ASSESSMENT — MIFFLIN-ST. JEOR: SCORE: 1238.22

## 2021-11-09 NOTE — PROGRESS NOTES
Tiffanie is a 61 year old  female who presents for annual exam.     Besides routine health maintenance,  she would like to discuss some genetic testing. Her sister was just diagnosed with stage 4 ovarian cancer.    HPI:  The patient's PCP is No primary care provider on file.. Patient here today for her annual GYN exam and Pap smear.  She has her mammogram scheduled for next month.  She is postmenopausal and reports no vaginal bleeding.  She has no GYN complaints at this time.  She is not on any hormone replacement therapy at this time.    1 level of concern this year is a new diagnosis of stage IV ovarian cancer in her sister.  It is unclear whether her sister is getting genetic testing or not.  Patient has questions surrounding her care going forward with this new development.    She retired last year and is enjoying her new routine.      GYNECOLOGIC HISTORY:    No LMP recorded. Patient is postmenopausal.    Her current contraception method is: vasectomy and menopause.  She  reports that she has never smoked. She has never used smokeless tobacco.    Patient is sexually active.  STD testing offered?  Declined  Last PHQ-9 score on record =   PHQ-9 SCORE 2021   PHQ-9 Total Score 0     Last GAD7 score on record =   CHESTER-7 SCORE 2021   Total Score 0     Alcohol Score = 4    HEALTH MAINTENANCE:  Cholesterol:   Recent Labs   Lab Test 01/15/14  0000   CHOL 171   HDL 63      TRIG 42   Last Mammo: One year ago, Result: Normal, Next Mammo: 2021  Pap:   Lab Results   Component Value Date    PAP NIL, HPV- 2018    PAP NIL 2017    PAP NIL 04/15/2016      Colonoscopy:  , Result: Normal, Next Colonoscopy: 5 years.  Dexa:  10/21/2020    Health maintenance updated:  yes    HISTORY:  OB History    Para Term  AB Living   3 0 0 0 0 3   SAB IAB Ectopic Multiple Live Births   0 0 0 0 3      # Outcome Date GA Lbr Ino/2nd Weight Sex Delivery Anes PTL Lv   3             2     "         1                 Patient Active Problem List   Diagnosis     Perimenopausal symptoms     Insomnia     Osteopenia     Past Surgical History:   Procedure Laterality Date     EYE SURGERY Bilateral     lasik     SOFT TISSUE SURGERY Left     lipoma on back      Social History     Tobacco Use     Smoking status: Never Smoker     Smokeless tobacco: Never Used   Substance Use Topics     Alcohol use: Yes     Comment: 3/wk      Problem (# of Occurrences) Relation (Name,Age of Onset)    Ovarian Cancer (1) Sister    Prostate Cancer (1) Father    Squamous cell carcinoma (1) Mother (88)            Current Outpatient Medications   Medication Sig     calcium carbonate (OS-LALITHA) 500 MG tablet Take 1 tablet by mouth 2 times daily     Cholecalciferol (VITAMIN D3 PO) Take 1,000 Units by mouth daily     No current facility-administered medications for this visit.     No Known Allergies    Past medical, surgical, social and family histories were reviewed and updated in EPIC.    ROS:   12 point review of systems negative other than symptoms noted below or in the HPI.  No urinary frequency or dysuria, bladder or kidney problems    EXAM:  /64   Pulse 62   Ht 1.568 m (5' 1.75\")   Wt 72.4 kg (159 lb 9.6 oz)   Breastfeeding No   BMI 29.43 kg/m     BMI: Body mass index is 29.43 kg/m .    PHYSICAL EXAM:  Constitutional:   Appearance: Well nourished, well developed, alert, in no acute distress  Neck:  Lymph Nodes:  No lymphadenopathy present    Thyroid:  Gland size normal, nontender, no nodules or masses present  on palpation  Chest:  Respiratory Effort:  Breathing unlabored  Cardiovascular:    Heart: Auscultation:  Regular rate, normal rhythm, no murmurs present  Breasts: Inspection of Breasts:  No lymphadenopathy present., Palpation of Breasts and Axillae:  No masses present on palpation, no breast tenderness., Axillary Lymph Nodes:  No lymphadenopathy present. and No nodularity, asymmetry or nipple discharge " bilaterally.  Gastrointestinal:   Abdominal Examination:  Abdomen nontender to palpation, tone normal without rigidity or guarding, no masses present, umbilicus without lesions   Liver and Spleen:  No hepatomegaly present, liver nontender to palpation    Hernias:  No hernias present  Lymphatic: Lymph Nodes:  No other lymphadenopathy present  Skin:  General Inspection:  No rashes present, no lesions present, no areas of  discoloration  Neurologic:    Mental Status:  Oriented X3.  Normal strength and tone, sensory exam                grossly normal, mentation intact and speech normal.    Psychiatric:   Mentation appears normal and affect normal/bright.         Pelvic Exam:  External Genitalia:     Normal appearance for age, no discharge present, no tenderness present, no inflammatory lesions present, color normal  Vagina:     Normal vaginal vault without central or paravaginal defects, no discharge present, no inflammatory lesions present, no masses present  Bladder:     Nontender to palpation  Urethra:   Urethral Body:  Urethra palpation normal, urethra structural support normal   Urethral Meatus:  No erythema or lesions present  Cervix:     Appearance healthy, no lesions present, nontender to palpation, no bleeding present  Uterus:     Uterus: firm, normal sized and nontender, midplane in position.   Adnexa:     No adnexal tenderness present, no adnexal masses present  Perineum:     Perineum within normal limits, no evidence of trauma, no rashes or skin lesions present  Anus:     Anus within normal limits, no hemorrhoids present  Inguinal Lymph Nodes:     No lymphadenopathy present  Pubic Hair:     Normal pubic hair distribution for age  Genitalia and Groin:     No rashes present, no lesions present, no areas of discoloration, no masses present      COUNSELING:   Special attention given to:        Regular exercise       Healthy diet/nutrition       (Colleen)menopause management    BMI: Body mass index is 29.43  kg/m .  Weight management plan: Discussed healthy diet and exercise guidelines    ASSESSMENT:  61 year old female with satisfactory annual exam.    ICD-10-CM    1. Encounter for gynecological examination without abnormal finding  Z01.419 Pap thin layer screen with HPV - recommended age 30 - 65 years   2. Screening for cardiovascular condition  Z13.6 Lipid panel reflex to direct LDL Fasting   3. Encounter for vitamin deficiency screening  Z13.21 Vitamin D Deficiency   4. Screening for diabetes mellitus  Z13.1 Glucose, whole blood   5. Family history of malignant neoplasm of ovary in first degree relative  Z80.41      US Transvaginal Non OB   6. Screening for thyroid disorder  Z13.29 TSH with free T4 reflex   7. Screening for disorder of blood and blood-forming organs  Z13.0 CBC with platelets       PLAN:  61-year-old female with a normal postmenopausal GYN exam.  Her mammogram is next month.  She is not fasting today but we will have her return to the clinic for fasting labs including a CA-125.  We have also asked her to return to the clinic for a transvaginal ultrasound.  We briefly discussed genetic testing and she will check with her sister to see if she had any testing done.  She is to continue with annual Pap screening.    ALTAGRACIA Winslow CNP

## 2021-11-10 ENCOUNTER — LAB (OUTPATIENT)
Dept: LAB | Facility: CLINIC | Age: 61
End: 2021-11-10
Payer: COMMERCIAL

## 2021-11-10 DIAGNOSIS — Z80.41 FAMILY HISTORY OF MALIGNANT NEOPLASM OF OVARY IN FIRST DEGREE RELATIVE: ICD-10-CM

## 2021-11-10 DIAGNOSIS — Z13.1 SCREENING FOR DIABETES MELLITUS: ICD-10-CM

## 2021-11-10 DIAGNOSIS — Z13.6 SCREENING FOR CARDIOVASCULAR CONDITION: ICD-10-CM

## 2021-11-10 DIAGNOSIS — Z13.0 SCREENING FOR DISORDER OF BLOOD AND BLOOD-FORMING ORGANS: ICD-10-CM

## 2021-11-10 DIAGNOSIS — Z13.21 ENCOUNTER FOR VITAMIN DEFICIENCY SCREENING: ICD-10-CM

## 2021-11-10 DIAGNOSIS — Z13.29 SCREENING FOR THYROID DISORDER: ICD-10-CM

## 2021-11-10 LAB
CANCER AG125 SERPL-ACNC: 10 U/ML (ref 0–30)
CHOLEST SERPL-MCNC: 221 MG/DL
DEPRECATED CALCIDIOL+CALCIFEROL SERPL-MC: 37 UG/L (ref 20–75)
ERYTHROCYTE [DISTWIDTH] IN BLOOD BY AUTOMATED COUNT: 13.9 % (ref 10–15)
FASTING STATUS PATIENT QL REPORTED: YES
GLUCOSE BLD-MCNC: 97 MG/DL (ref 60–99)
HCT VFR BLD AUTO: 43.5 % (ref 35–47)
HDLC SERPL-MCNC: 72 MG/DL
HGB BLD-MCNC: 13.8 G/DL (ref 11.7–15.7)
LDLC SERPL CALC-MCNC: 136 MG/DL
MCH RBC QN AUTO: 27.7 PG (ref 26.5–33)
MCHC RBC AUTO-ENTMCNC: 31.7 G/DL (ref 31.5–36.5)
MCV RBC AUTO: 87 FL (ref 78–100)
NONHDLC SERPL-MCNC: 149 MG/DL
PLATELET # BLD AUTO: 299 10E3/UL (ref 150–450)
RBC # BLD AUTO: 4.99 10E6/UL (ref 3.8–5.2)
TRIGL SERPL-MCNC: 65 MG/DL
TSH SERPL DL<=0.005 MIU/L-ACNC: 0.51 MU/L (ref 0.4–4)
WBC # BLD AUTO: 6.9 10E3/UL (ref 4–11)

## 2021-11-10 PROCEDURE — 82306 VITAMIN D 25 HYDROXY: CPT

## 2021-11-10 PROCEDURE — 86304 IMMUNOASSAY TUMOR CA 125: CPT

## 2021-11-10 PROCEDURE — 80061 LIPID PANEL: CPT

## 2021-11-10 PROCEDURE — 82947 ASSAY GLUCOSE BLOOD QUANT: CPT

## 2021-11-10 PROCEDURE — 84443 ASSAY THYROID STIM HORMONE: CPT

## 2021-11-10 PROCEDURE — 85027 COMPLETE CBC AUTOMATED: CPT

## 2021-11-10 PROCEDURE — 36415 COLL VENOUS BLD VENIPUNCTURE: CPT

## 2021-11-10 ASSESSMENT — PATIENT HEALTH QUESTIONNAIRE - PHQ9: SUM OF ALL RESPONSES TO PHQ QUESTIONS 1-9: 0

## 2021-11-10 ASSESSMENT — ANXIETY QUESTIONNAIRES: GAD7 TOTAL SCORE: 0

## 2021-11-11 LAB
BKR LAB AP GYN ADEQUACY: NORMAL
BKR LAB AP GYN INTERPRETATION: NORMAL
BKR LAB AP HPV REFLEX: NORMAL
BKR LAB AP PREVIOUS ABNORMAL: NORMAL
PATH REPORT.COMMENTS IMP SPEC: NORMAL
PATH REPORT.COMMENTS IMP SPEC: NORMAL
PATH REPORT.RELEVANT HX SPEC: NORMAL

## 2021-11-12 LAB
HUMAN PAPILLOMA VIRUS 16 DNA: NEGATIVE
HUMAN PAPILLOMA VIRUS 18 DNA: NEGATIVE
HUMAN PAPILLOMA VIRUS FINAL DIAGNOSIS: NORMAL
HUMAN PAPILLOMA VIRUS OTHER HR: NEGATIVE

## 2021-11-19 ENCOUNTER — TELEPHONE (OUTPATIENT)
Dept: OBGYN | Facility: CLINIC | Age: 61
End: 2021-11-19
Payer: COMMERCIAL

## 2021-11-19 NOTE — TELEPHONE ENCOUNTER
Patient asked to speak with a nurse. She is wondering if she got a tetanus shot last time she was here? Please return call.

## 2021-11-22 NOTE — PROGRESS NOTES
SUBJECTIVE:                                                   Tiffanie Castrejon is a 61 year old female who presents to clinic today for the following health issue(s):  Patient presents with:  Ultrasound: Family history of malignant neoplasm of ovary in first degree relative       Additional information: F/u ultrasound - family hx ovarian cancer.   11/10/21  =10.    HPI:  Patient here today for transvaginal ultrasound.  She has a family history of sister with ovarian cancer.  Her CA-125 last week was in the normal range at 10.  She would also like to further discussed genetic counseling.    No LMP recorded. Patient is postmenopausal.     Patient is sexually active, .  Using menopause for contraception.    reports that she has never smoked. She has never used smokeless tobacco.    STD testing offered?  Declined    Health maintenance updated:  yes    Today's PHQ-2 Score:   PHQ-2 (  [employer]) 2015   Q1: Little interest or pleasure in doing things 0   Q2: Feeling down, depressed or hopeless 0   PHQ-2 Score 0     Today's PHQ-9 Score:   PHQ-9 SCORE 2021   PHQ-9 Total Score 0     Today's CHESTER-7 Score:   CHESTER-7 SCORE 2021   Total Score 0       Problem list and histories reviewed & adjusted, as indicated.  Additional history: as documented.    Patient Active Problem List   Diagnosis     Perimenopausal symptoms     Insomnia     Osteopenia     Past Surgical History:   Procedure Laterality Date     EYE SURGERY Bilateral     lasik     SOFT TISSUE SURGERY Left 1992    lipoma on back      Social History     Tobacco Use     Smoking status: Never Smoker     Smokeless tobacco: Never Used   Substance Use Topics     Alcohol use: Yes     Comment: 3/wk      Problem (# of Occurrences) Relation (Name,Age of Onset)    Ovarian Cancer (1) Sister    Prostate Cancer (1) Father    Squamous cell carcinoma (1) Mother (88)            Current Outpatient Medications   Medication Sig     calcium carbonate (OS-LALITHA) 500 MG  "tablet Take 1 tablet by mouth 2 times daily     Cholecalciferol (VITAMIN D3 PO) Take 1,000 Units by mouth daily     No current facility-administered medications for this visit.     No Known Allergies    ROS:  12 point review of systems negative other than symptoms noted below or in the HPI.  No urinary frequency or dysuria, bladder or kidney problems      OBJECTIVE:     BP 94/64   Ht 1.568 m (5' 1.75\")   Wt 71.3 kg (157 lb 3.2 oz)   Breastfeeding No   BMI 28.99 kg/m    Body mass index is 28.99 kg/m .    Exam:  Constitutional:  Appearance: Well nourished, well developed alert, in no acute distress  Psychiatric:  Mentation appears normal and affect normal/bright.     In-Clinic Test Results:  Results for orders placed or performed in visit on 11/23/21 (from the past 24 hour(s))   US Transvaginal Non OB    Narrative    Gynecological Ultrasound Report  Pelvic U/S - Transvaginal  [facility]  Referring Provider: Lila Darling NP  Sonographer:  Krysta Muñiz RDMS  Indication: Family history of ovarian cancer  LMP: No LMP recorded. Patient is postmenopausal.  History:   Gynecological Ultrasonography:   Uterus: anteverted. Contour is smooth/regular.  Size: 6.64 x 4.68 x 3.40 cm  Endometrium: Thickness Total 1.98 mm  Findings: Normal  Right Ovary: 1.98 x 1.46 x 1.18 cm. Shadowing right ovarian mass vs   fibroid 1.6x 1.2x 1.2cm  Left Ovary: 1.76 x 1.26 x 1.10 cm. Wnl  Cul de Sac Free Fluid: No free fluid     Impression:        Uterus and left ovary visualized and no abnormalities noted.  Endometrial lining normal.  Right ovary normal.   Small right sided mass noted.  Most consistent with fibroid but cannot   separate from right ovary and therefore cannot rule out ovarian mass.  Consider MRI for further evaluation.  No free fluid.    Opal Wilson MD       ASSESSMENT/PLAN:                                                        ICD-10-CM    1. Family history of malignant neoplasm of ovary in first " degree relative  Z80.41 Other Laboratory; [facility]; [facility] (Laboratory Miscellaneous Order)     MR Pelvis (GYN) wo & w Contrast     Other Laboratory; [facility]; [facility] (Laboratory Miscellaneous Order)   2. Pelvic mass  R19.00 MR Pelvis (GYN) wo & w Contrast       There are no Patient Instructions on file for this visit.    Ultrasound shows a normal uterus and left ovary.  Her uterine lining is thin.  Her right ovary appears normal however there is a small mass noted adjacent to the ovary.  We will have her follow-up with a pelvic MRI and have also discussed myriad my risk genetic testing.    (5 minutes was spent on the date of the encounter doing chart review, review of outside records, review and interpretation of pertinent test results, history and exam, documentation, patient counseling, and further activities as noted above.)      ALTAGRACIA Winslow CNP  [facility] [place]

## 2021-11-23 ENCOUNTER — ANCILLARY PROCEDURE (OUTPATIENT)
Dept: ULTRASOUND IMAGING | Facility: CLINIC | Age: 61
End: 2021-11-23
Attending: NURSE PRACTITIONER
Payer: COMMERCIAL

## 2021-11-23 ENCOUNTER — TELEPHONE (OUTPATIENT)
Dept: OBGYN | Facility: CLINIC | Age: 61
End: 2021-11-23

## 2021-11-23 ENCOUNTER — OFFICE VISIT (OUTPATIENT)
Dept: OBGYN | Facility: CLINIC | Age: 61
End: 2021-11-23
Attending: NURSE PRACTITIONER
Payer: COMMERCIAL

## 2021-11-23 VITALS
DIASTOLIC BLOOD PRESSURE: 64 MMHG | WEIGHT: 157.2 LBS | HEIGHT: 62 IN | BODY MASS INDEX: 28.93 KG/M2 | SYSTOLIC BLOOD PRESSURE: 94 MMHG

## 2021-11-23 DIAGNOSIS — Z23 NEED FOR TDAP VACCINATION: ICD-10-CM

## 2021-11-23 DIAGNOSIS — R19.00 PELVIC MASS: ICD-10-CM

## 2021-11-23 DIAGNOSIS — Z80.41 FAMILY HISTORY OF MALIGNANT NEOPLASM OF OVARY IN FIRST DEGREE RELATIVE: Primary | ICD-10-CM

## 2021-11-23 DIAGNOSIS — Z80.41 FAMILY HISTORY OF MALIGNANT NEOPLASM OF OVARY IN FIRST DEGREE RELATIVE: ICD-10-CM

## 2021-11-23 PROCEDURE — 76830 TRANSVAGINAL US NON-OB: CPT | Performed by: OBSTETRICS & GYNECOLOGY

## 2021-11-23 PROCEDURE — 90471 IMMUNIZATION ADMIN: CPT | Performed by: NURSE PRACTITIONER

## 2021-11-23 PROCEDURE — 36415 COLL VENOUS BLD VENIPUNCTURE: CPT | Performed by: NURSE PRACTITIONER

## 2021-11-23 PROCEDURE — 99000 SPECIMEN HANDLING OFFICE-LAB: CPT | Performed by: NURSE PRACTITIONER

## 2021-11-23 PROCEDURE — 90715 TDAP VACCINE 7 YRS/> IM: CPT | Performed by: NURSE PRACTITIONER

## 2021-11-23 PROCEDURE — 99213 OFFICE O/P EST LOW 20 MIN: CPT | Mod: 25 | Performed by: NURSE PRACTITIONER

## 2021-11-23 ASSESSMENT — MIFFLIN-ST. JEOR: SCORE: 1227.33

## 2021-11-23 NOTE — TELEPHONE ENCOUNTER
Is up for jury duty in the next 3 weeks-of course not sure when she would be needed at this point.    Needs to schedule an MRI and does not want to put this test off due to jury duty.    Requesting a letter to defer jury duty at this time. If approved the letter can be faxed to [phone] it does not need to be sent to a specific person.  Faina Alatorre RN on 11/23/2021 at 11:50 AM

## 2021-11-23 NOTE — LETTER
[facility]  [address] , [address]  [place]   [id number] [phone]        11/23/2021     Tiffanie Websteraydee  [address] [place] [id number]        Tiffanie Castrejon is currently under our care.  It is imperative that she is excused from her impending jury duty.  We would like to defer this duty until a later date.  The patient has some medical needs that need to be addressed and it is imperative that she has these done in a timely manner.      Cordially,    ALTAGRACIA Wnislow CNP

## 2021-12-13 ENCOUNTER — HOSPITAL ENCOUNTER (OUTPATIENT)
Dept: MRI IMAGING | Facility: CLINIC | Age: 61
Discharge: HOME | End: 2021-12-13
Attending: NURSE PRACTITIONER | Admitting: NURSE PRACTITIONER
Payer: COMMERCIAL

## 2021-12-13 DIAGNOSIS — Z80.41 FAMILY HISTORY OF MALIGNANT NEOPLASM OF OVARY IN FIRST DEGREE RELATIVE: ICD-10-CM

## 2021-12-13 DIAGNOSIS — R19.00 PELVIC MASS: ICD-10-CM

## 2021-12-13 PROCEDURE — A9585 GADOBUTROL INJECTION: HCPCS | Performed by: NURSE PRACTITIONER

## 2021-12-13 PROCEDURE — 255N000002 HC RX 255 OP 636: Performed by: NURSE PRACTITIONER

## 2021-12-13 PROCEDURE — 72197 MRI PELVIS W/O & W/DYE: CPT

## 2021-12-13 RX ORDER — GADOBUTROL 604.72 MG/ML
7 INJECTION INTRAVENOUS ONCE
Status: COMPLETED | OUTPATIENT
Start: 2021-12-13 | End: 2021-12-13

## 2021-12-13 RX ADMIN — GADOBUTROL 7 ML: 604.72 INJECTION INTRAVENOUS at 18:20

## 2021-12-14 NOTE — RESULT ENCOUNTER NOTE
Called patient with results. Repeat US in 3 months-pt did not like MRI and would like to defer if possible.

## 2021-12-21 LAB
PERFORMING LABORATORY: NORMAL
SPECIMEN STATUS: NORMAL
TEST NAME: NORMAL

## 2022-03-23 ENCOUNTER — ANCILLARY PROCEDURE (OUTPATIENT)
Dept: MAMMOGRAPHY | Facility: CLINIC | Age: 62
End: 2022-03-23
Attending: NURSE PRACTITIONER
Payer: COMMERCIAL

## 2022-03-23 ENCOUNTER — OFFICE VISIT (OUTPATIENT)
Dept: OBGYN | Facility: CLINIC | Age: 62
End: 2022-03-23
Attending: NURSE PRACTITIONER
Payer: COMMERCIAL

## 2022-03-23 ENCOUNTER — ANCILLARY PROCEDURE (OUTPATIENT)
Dept: ULTRASOUND IMAGING | Facility: CLINIC | Age: 62
End: 2022-03-23
Attending: NURSE PRACTITIONER
Payer: COMMERCIAL

## 2022-03-23 VITALS
WEIGHT: 157.6 LBS | SYSTOLIC BLOOD PRESSURE: 92 MMHG | BODY MASS INDEX: 29 KG/M2 | DIASTOLIC BLOOD PRESSURE: 66 MMHG | HEIGHT: 62 IN

## 2022-03-23 DIAGNOSIS — R19.00 PELVIC MASS: ICD-10-CM

## 2022-03-23 DIAGNOSIS — Z80.41 FAMILY HISTORY OF MALIGNANT NEOPLASM OF OVARY IN FIRST DEGREE RELATIVE: Primary | ICD-10-CM

## 2022-03-23 DIAGNOSIS — Z12.31 OTHER SCREENING MAMMOGRAM: ICD-10-CM

## 2022-03-23 PROCEDURE — 99213 OFFICE O/P EST LOW 20 MIN: CPT | Performed by: NURSE PRACTITIONER

## 2022-03-23 PROCEDURE — 77067 SCR MAMMO BI INCL CAD: CPT | Mod: TC | Performed by: RADIOLOGY

## 2022-03-23 PROCEDURE — 76830 TRANSVAGINAL US NON-OB: CPT | Performed by: OBSTETRICS & GYNECOLOGY

## 2022-03-23 PROCEDURE — 77063 BREAST TOMOSYNTHESIS BI: CPT | Mod: TC | Performed by: RADIOLOGY

## 2022-09-11 ENCOUNTER — HEALTH MAINTENANCE LETTER (OUTPATIENT)
Age: 62
End: 2022-09-11

## 2022-10-12 ENCOUNTER — MYC MEDICAL ADVICE (OUTPATIENT)
Dept: OBGYN | Facility: CLINIC | Age: 62
End: 2022-10-12

## 2022-10-12 ENCOUNTER — PATIENT OUTREACH (OUTPATIENT)
Dept: ONCOLOGY | Facility: CLINIC | Age: 62
End: 2022-10-12

## 2022-10-12 DIAGNOSIS — Z80.41 FAMILY HISTORY OF MALIGNANT NEOPLASM OF OVARY IN FIRST DEGREE RELATIVE: ICD-10-CM

## 2022-10-12 DIAGNOSIS — R19.00 PELVIC MASS: Primary | ICD-10-CM

## 2022-10-12 NOTE — PROGRESS NOTES
"New Patient Hematology / Oncology Nurse Navigator Note     Referral Date: 10/12/22    Referring provider:   Lila Darling, ALTAGRACIA CNP   [address] [place] [facility]   [place] [address]   Phone: [phone]   Fax: [phone]       Referring Clinic/Organization: [facility]     Referred to: GynOnc    Requested provider (if applicable): First available - did not specify     Evaluation for : Pelvic Mass. \"persistent right adnexal mass\"     Clinical History (per Nurse review of records provided):      11/10/21  (normal at 10) -- BOOKMARKED     3/23/22 US showing:  Impression:   Uterus normal with thin endometrial lining.  Left ovary normal.  Right adnexal mass unchanged from prior imaging; fibroid vs right adnexal mass measuring 1.3x1.4x1.1cm.   No free fluid.-- BOOKMARKED    12/13/21 MRI Pelvis showing:                                                IMPRESSION:  There is a 1.1 cm T2 hypointense nodule between the right ovary and  uterus. This nodule is poorly evaluated on T1 imaging due to small  size and remains indeterminate. This most likely represents a small  subserosal fibroid. Recommend follow-up pelvic MRI in 3 months for  further evaluation.    3/22/22 Office Visit with referring provider:   \"Ultrasound today shows a persistent right adnexal mass measuring 1.3 x 1.4 cm.  Her uterus and left ovary are unremarkable.  We did recommend a gyn/oncology consult.\" -- BOOKMARKED    Clinical Assessment / Barriers to Care (Per Nurse):  N/A     Records Location: [facility]     Records Needed:   N/A    Additional testing needed prior to consult:   IB to referring provider asking if willing to order repeat labs/imaging since most recent are March 2022.     Referral updates and Plan:   Will follow-up with patient to discuss plan pending response from referring MD re: repeat labs/imaging    Addendum 10/12 8495: Referring MD agreed to order repeat labs/US. Writer contacted patient to discuss. Will plan to arrange repeat " labs/US and appointment with GynOnc a few days later.     Writer will follow-up pending results of US/labs.     Medina Talley, BSN, RN, PHN, OCN  Hematology/Oncology Nurse Navigator  [facility]  [phone]

## 2022-10-12 NOTE — TELEPHONE ENCOUNTER
3/23/22 OV:Ultrasound today shows a persistent right adnexal mass measuring 1.3 x 1.4 cm.  Her uterus and left ovary are unremarkable.We did recommend a gyn/oncology consult.     Referral t'd up.  Routing to Lila Whiteside CNP to review and advise.    Dolores Collins RN

## 2022-10-14 ENCOUNTER — ANCILLARY PROCEDURE (OUTPATIENT)
Dept: ULTRASOUND IMAGING | Facility: CLINIC | Age: 62
End: 2022-10-14
Attending: NURSE PRACTITIONER
Payer: COMMERCIAL

## 2022-10-14 ENCOUNTER — LAB (OUTPATIENT)
Dept: LAB | Facility: CLINIC | Age: 62
End: 2022-10-14
Payer: COMMERCIAL

## 2022-10-14 DIAGNOSIS — Z80.41 FAMILY HISTORY OF MALIGNANT NEOPLASM OF OVARY IN FIRST DEGREE RELATIVE: ICD-10-CM

## 2022-10-14 DIAGNOSIS — R19.00 PELVIC MASS: ICD-10-CM

## 2022-10-14 LAB — CANCER AG125 SERPL-ACNC: 15 U/ML

## 2022-10-14 PROCEDURE — 86304 IMMUNOASSAY TUMOR CA 125: CPT

## 2022-10-14 PROCEDURE — 76830 TRANSVAGINAL US NON-OB: CPT

## 2022-10-14 PROCEDURE — 36415 COLL VENOUS BLD VENIPUNCTURE: CPT

## 2022-10-17 NOTE — PROGRESS NOTES
10/14/22  (15), 11/10/21  (10)  10/14/22 US showing:   IMPRESSION:  1.  1.3 cm right ovarian nodule with posterior acoustical shadowing. This is stable from prior examinations and likely represents a small fibroma.    Pt scheduled with Dr. Azar 10/26. Reviewed referral/results with Dr. Azar who confirms ok to keep appointment as scheduled since OBGYN is referring patient. Left VM updating patient plan to keep appointment as scheduled. Provided call back number if further questions.

## 2022-10-19 NOTE — TELEPHONE ENCOUNTER
RECORDS STATUS - ALL OTHER DIAGNOSIS      RECORDS RECEIVED FROM: [facility]   DATE RECEIVED: 10/19   NOTES STATUS DETAILS   OFFICE NOTE from referring provider Epic Lila Darling APRN CNP in [facility]: 3/23/22   MEDICATION LIST [facility]    LABS     ANYTHING RELATED TO DIAGNOSIS Epic 10/14/22   GENONOMIC TESTING     TYPE: Epic 11/23/21: MyRisk   IMAGING (NEED IMAGES & REPORT)     MRI PACS 12/13/21: Epic   ULTRASOUND PACS 10/14/22, 3/23/22, 11/23/21: Epic

## 2022-10-24 NOTE — PROGRESS NOTES
Gynecologic Oncology New Patient Consult    Referring provider:    Lila Darling, APRN CNP  [address]  [place] [address]   RE: Tiffanie Castrejon  : 1960  ZAK: 2022      CC: pelvic mass, family history of ovarian cancer    HPI: Ms Tiffanie Castrejon is a 62 year old year old female who presents for consultation. She is here today alone.    She notes a history of a right sided ovarian/uterus mass which was found on imaging she had done given her family history of ovarian cancer. Most recent imaging includes an MRI 2021 and a TVUS 10/2022. TVUS showed a 1.3cm nodule, solid, with shadowing. This was stable from the MRI in December which suggested a uterine vs adnexal mass.     CA-125 on 10/14/22 was normal at 15.     Her sister had ovarian cancer (surgery and chemo and currently on maintenance therapy). Gris herself had negative panel genetic testing, which I reviewed today.     She has not bloating, pain, vaginal bleeding.       Past Medical History:   Diagnosis Date     Hot flashes, menopausal      Insomnia     2/2 stress and menopause     Uterine fibroid        Past Surgical History:   Procedure Laterality Date     EYE SURGERY Bilateral     lasik     SOFT TISSUE SURGERY Left     lipoma on back        OBGYN history and Health Maintenance (Personally reviewed 10/26/2022):   (ages [age])  Last Pap Smear: HR HPVnegative 21  Last Mammogram:3/23/22 BiRads 1  Last Colonoscopy: 2016    Medications and allergies reviewed in EPIC    Social History:  Social History     Tobacco Use     Smoking status: Never     Smokeless tobacco: Never   Substance Use Topics     Alcohol use: Yes     Comment: 3/wk     Work: Retired from non profit  Ethnicity identification: white.   Preferred language: English  Lives at home with:     Family History:   The patient's family history is notable for:  Paternal first cousin with ovarian cancer  Sister with ovarian cancer, treated in   "[place]  Father with prostate cancer  Mother with metastatic SCC    Physical Exam:     /81 (BP Location: Left arm, Patient Position: Sitting, Cuff Size: Adult Large)   Pulse 76   Temp 98.5  F (36.9  C) (Oral)   Resp 20   Ht 1.565 m (5' 1.61\")   Wt 72.3 kg (159 lb 6.4 oz)   SpO2 98%   BMI 29.52 kg/m    Body mass index is 29.52 kg/m .    General: Alert and oriented, no acute distress  Psych: Mood stable. Linear speech, appropriate affect      Remainder of exam deferred    Labs/Imaging (Personally reviewed today,  10/26/2022)    10/14/22:    15    TVUS  10/14/22:    Narrative & Impression   EXAM: US TRANSVAGINAL PELVIC NON-OB  LOCATION: [facility]  DATE/TIME: 10/14/2022 12:08 PM     INDICATION: Pelvic mass, Family history of malignant neoplasm of ovary in first degree relative.  COMPARISON: 03/23/2022, 12/13/2021.  TECHNIQUE: Endovaginal ultrasound was performed to better visualize the adnexa.     FINDINGS:     UTERUS: 7 x 5 x 3 cm. Normal in size and position with no masses.     ENDOMETRIUM: 4 mm. Normal smooth endometrium.     RIGHT OVARY: 2.5 x 1.6 x 1.4 cm. 1.3 cm nodule in the right ovary is hypoechoic with posterior acoustical shadowing.     LEFT OVARY: 2.5 x 1.4 x 1.4 cm. Normal with flow demonstrated.     No significant free fluid.                                                                      IMPRESSION:  1.  1.3 cm right ovarian nodule with posterior acoustical shadowing. This is stable from prior examinations and likely represents a small fibroma.                    MRI 12/13/21:     IMPRESSION:  There is a 1.1 cm T2 hypointense nodule between the right ovary and  uterus. This nodule is poorly evaluated on T1 imaging due to small  size and remains indeterminate. This most likely represents a small  subserosal fibroid. Recommend follow-up pelvic MRI in 3 months for  further evaluation.     TIARRA GILBERT MD       Reviewed full panel testing from 11/2021: [facility]ll " panel testing was all negative (see scanned results from 12/21/2021)     Assessment:    Tiffanie Castrejon is a 62 year old woman with a new diagnosis of an ovarian mass, family history of ovarian cancer.         Plan:     1.)   Pelvic mass and family history of ovarian cancer: Imaging and  are suggestive of a benign right ovarian mass, likely a fibroma. This could be originating from the uterus as well. Chance of malignancy is low, but I explained that the only way to know for sure is surgical excision. We discussed these findings in light of her family history (sister and paternal cousin with ovarian cancer). We reviewed that about 20% of advanced ovarian cancer is genetic. With negative panel testing it is unlikely she has a risk above baseline, however there are likely some clusters of cancer in families that we have not yet identified the potential genetic mechanism. I reviewed that prophylactic surgery is not recommended given her negative testing, however given she does have a finding on TVUS, it certainly would be reasonable to perform a BSO (I do not feel a hysterectomy is indicated) . We reviewed how there is no screening test for ovarian cancer.   She would like to consider options and will get back to us. If she would like to move forward would plan laparoscopic BSO. Discussed surgical risks not limited to bleeding, infection, damage to surrounding organs, need for blood transfusions and ICU stay.     -Possible BSO  -Will not need any further preoperative testing except a H&P/pre-op from PCP     2.)Genetic risk factors were assessed: Has had panel testing        Total visit time today was 60 minutes, which included review of labs, personally reviewing images , reviewing outside records, face to face time with the patient,  Documentation.      Dianne Azar MD    Department of Ob/Gyn and Women's Health  Division of Gynecologic Oncology  [facility]  Pager [phone]

## 2022-10-26 ENCOUNTER — TELEPHONE (OUTPATIENT)
Dept: ONCOLOGY | Facility: CLINIC | Age: 62
End: 2022-10-26

## 2022-10-26 ENCOUNTER — PRE VISIT (OUTPATIENT)
Dept: ONCOLOGY | Facility: CLINIC | Age: 62
End: 2022-10-26

## 2022-10-26 ENCOUNTER — ONCOLOGY VISIT (OUTPATIENT)
Dept: ONCOLOGY | Facility: CLINIC | Age: 62
End: 2022-10-26
Attending: NURSE PRACTITIONER
Payer: COMMERCIAL

## 2022-10-26 VITALS
HEART RATE: 76 BPM | SYSTOLIC BLOOD PRESSURE: 120 MMHG | OXYGEN SATURATION: 98 % | WEIGHT: 159.4 LBS | BODY MASS INDEX: 29.33 KG/M2 | DIASTOLIC BLOOD PRESSURE: 81 MMHG | TEMPERATURE: 98.5 F | RESPIRATION RATE: 20 BRPM | HEIGHT: 62 IN

## 2022-10-26 DIAGNOSIS — Z80.41 FAMILY HISTORY OF MALIGNANT NEOPLASM OF OVARY IN FIRST DEGREE RELATIVE: ICD-10-CM

## 2022-10-26 DIAGNOSIS — R19.00 PELVIC MASS: ICD-10-CM

## 2022-10-26 PROCEDURE — G0463 HOSPITAL OUTPT CLINIC VISIT: HCPCS

## 2022-10-26 PROCEDURE — 99205 OFFICE O/P NEW HI 60 MIN: CPT | Performed by: OBSTETRICS & GYNECOLOGY

## 2022-10-26 ASSESSMENT — PAIN SCALES - GENERAL: PAINLEVEL: NO PAIN (0)

## 2022-10-26 NOTE — PROGRESS NOTES
"Oncology Rooming Note    October 26, 2022 10:26 AM   Tiffanie Castrejon is a 62 year old female who presents for:    Chief Complaint   Patient presents with     Oncology Clinic Visit     Initial Vitals: /81 (BP Location: Left arm, Patient Position: Sitting, Cuff Size: Adult Large)   Pulse 76   Temp 98.5  F (36.9  C) (Oral)   Resp 20   Ht 1.565 m (5' 1.61\")   Wt 72.3 kg (159 lb 6.4 oz)   SpO2 98%   BMI 29.52 kg/m   Estimated body mass index is 29.52 kg/m  as calculated from the following:    Height as of this encounter: 1.565 m (5' 1.61\").    Weight as of this encounter: 72.3 kg (159 lb 6.4 oz). Body surface area is 1.77 meters squared.  No Pain (0) Comment: Data Unavailable   No LMP recorded. Patient is postmenopausal.  Allergies reviewed: Yes  Medications reviewed: Yes    Medications: Medication refills not needed today.  Pharmacy name entered into [facility]:    [facility] #[id number] - [address]  WRITTEN PRESCRIPTION REQUESTED  [id number] - [place], [place] - [address]    Clinical concerns: no      Mary Macias CMA              "

## 2022-10-26 NOTE — LETTER
10/26/2022         RE: Tiffanie Castrejon  [address] [facility] [address]        Dear Colleague,    Thank you for referring your patient, Tiffanie Castrejon, to the [facility]. Please see a copy of my visit note below.    Gynecologic Oncology New Patient Consult    Referring provider:    Lila Darling, APRN CNP  [address]  [place] [address]   RE: Tiffanie Castrejon  : 1960  ZAK: 2022      CC: pelvic mass, family history of ovarian cancer    HPI: Ms Tiffanie Castrejon is a 62 year old year old female who presents for consultation. She is here today alone.    She notes a history of a right sided ovarian/uterus mass which was found on imaging she had done given her family history of ovarian cancer. Most recent imaging includes an MRI 2021 and a TVUS 10/2022. TVUS showed a 1.3cm nodule, solid, with shadowing. This was stable from the MRI in December which suggested a uterine vs adnexal mass.     CA-125 on 10/14/22 was normal at 15.     Her sister had ovarian cancer (surgery and chemo and currently on maintenance therapy). Gris herself had negative panel genetic testing, which I reviewed today.     She has not bloating, pain, vaginal bleeding.       Past Medical History:   Diagnosis Date     Hot flashes, menopausal      Insomnia     2/2 stress and menopause     Uterine fibroid        Past Surgical History:   Procedure Laterality Date     EYE SURGERY Bilateral     lasik     SOFT TISSUE SURGERY Left     lipoma on back        OBGYN history and Health Maintenance (Personally reviewed 10/26/2022):   (ages 26-36)  Last Pap Smear: HR HPVnegative 21  Last Mammogram:3/23/22 BiRads 1  Last Colonoscopy: 2016    Medications and allergies reviewed in EPIC    Social History:  Social History     Tobacco Use     Smoking status: Never     Smokeless tobacco: Never   Substance Use Topics     Alcohol use: Yes     Comment: 3/wk     Work: Retired from  "non profit  Ethnicity identification: white.   Preferred language: English  Lives at home with:     Family History:   The patient's family history is notable for:  Paternal first cousin with ovarian cancer  Sister with ovarian cancer, treated in [facility]  Father with prostate cancer  Mother with metastatic SCC    Physical Exam:     /81 (BP Location: Left arm, Patient Position: Sitting, Cuff Size: Adult Large)   Pulse 76   Temp 98.5  F (36.9  C) (Oral)   Resp 20   Ht 1.565 m (5' 1.61\")   Wt 72.3 kg (159 lb 6.4 oz)   SpO2 98%   BMI 29.52 kg/m    Body mass index is 29.52 kg/m .    General: Alert and oriented, no acute distress  Psych: Mood stable. Linear speech, appropriate affect      Remainder of exam deferred    Labs/Imaging (Personally reviewed today,  10/26/2022)    10/14/22:    15    TVUS  10/14/22:    Narrative & Impression   EXAM: US TRANSVAGINAL PELVIC NON-OB  LOCATION: [facility]  DATE/TIME: 10/14/2022 12:08 PM     INDICATION: Pelvic mass, Family history of malignant neoplasm of ovary in first degree relative.  COMPARISON: 03/23/2022, 12/13/2021.  TECHNIQUE: Endovaginal ultrasound was performed to better visualize the adnexa.     FINDINGS:     UTERUS: 7 x 5 x 3 cm. Normal in size and position with no masses.     ENDOMETRIUM: 4 mm. Normal smooth endometrium.     RIGHT OVARY: 2.5 x 1.6 x 1.4 cm. 1.3 cm nodule in the right ovary is hypoechoic with posterior acoustical shadowing.     LEFT OVARY: 2.5 x 1.4 x 1.4 cm. Normal with flow demonstrated.     No significant free fluid.                                                                      IMPRESSION:  1.  1.3 cm right ovarian nodule with posterior acoustical shadowing. This is stable from prior examinations and likely represents a small fibroma.                    MRI 12/13/21:     IMPRESSION:  There is a 1.1 cm T2 hypointense nodule between the right ovary and  uterus. This nodule is poorly evaluated on T1 " imaging due to small  size and remains indeterminate. This most likely represents a small  subserosal fibroid. Recommend follow-up pelvic MRI in 3 months for  further evaluation.     TIARRA GILBERT MD       Reviewed full panel testing from 11/2021: FUll panel testing was all negative (see scanned results from 12/21/2021)     Assessment:    Tiffanie Castrejon is a 62 year old woman with a new diagnosis of an ovarian mass, family history of ovarian cancer.         Plan:     1.)   Pelvic mass and family history of ovarian cancer: Imaging and  are suggestive of a benign right ovarian mass, likely a fibroma. This could be originating from the uterus as well. Chance of malignancy is low, but I explained that the only way to know for sure is surgical excision. We discussed these findings in light of her family history (sister and paternal cousin with ovarian cancer). We reviewed that about 20% of advanced ovarian cancer is genetic. With negative panel testing it is unlikely she has a risk above baseline, however there are likely some clusters of cancer in families that we have not yet identified the potential genetic mechanism. I reviewed that prophylactic surgery is not recommended given her negative testing, however given she does have a finding on TVUS, it certainly would be reasonable to perform a BSO (I do not feel a hysterectomy is indicated) . We reviewed how there is no screening test for ovarian cancer.   She would like to consider options and will get back to us. If she would like to move forward would plan laparoscopic BSO. Discussed surgical risks not limited to bleeding, infection, damage to surrounding organs, need for blood transfusions and ICU stay.     -Possible BSO  -Will not need any further preoperative testing except a H&P/pre-op from PCP     2.)Genetic risk factors were assessed: Has had panel testing        Total visit time today was 60 minutes, which included review of labs, personally  "reviewing images , reviewing outside records, face to face time with the patient,  Documentation.      Dianne Azar MD    Department of Ob/Gyn and Women's Health  Division of Gynecologic Oncology  [facility]  Pager [phone]          Oncology Rooming Note    October 26, 2022 10:26 AM   Tiffanie Castrejon is a 62 year old female who presents for:    Chief Complaint   Patient presents with     Oncology Clinic Visit     Initial Vitals: /81 (BP Location: Left arm, Patient Position: Sitting, Cuff Size: Adult Large)   Pulse 76   Temp 98.5  F (36.9  C) (Oral)   Resp 20   Ht 1.565 m (5' 1.61\")   Wt 72.3 kg (159 lb 6.4 oz)   SpO2 98%   BMI 29.52 kg/m   Estimated body mass index is 29.52 kg/m  as calculated from the following:    Height as of this encounter: 1.565 m (5' 1.61\").    Weight as of this encounter: 72.3 kg (159 lb 6.4 oz). Body surface area is 1.77 meters squared.  No Pain (0) Comment: Data Unavailable   No LMP recorded. Patient is postmenopausal.  Allergies reviewed: Yes  Medications reviewed: Yes    Medications: Medication refills not needed today.  Pharmacy name entered into We[employer]:    [facility] #[id number] - [address]  WRITTEN PRESCRIPTION REQUESTED  [facility] #[id number] - [place], [place] - [address] [place] [address]    Clinical concerns: no      Mary Macias [facility]                  Again, thank you for allowing me to participate in the care of your patient.        Sincerely,        Dianne Azar MD    "

## 2022-10-26 NOTE — TELEPHONE ENCOUNTER
Patient calls with symptoms of a viral infection and she has a new patient appt today with Dr. Azar at [facility].    She is feeling well enough to come in and tested negative for Covid, but wants to make sure the care team is okay with this.    Call to Anita RNJEFFERY, who will forward to RNJEFFERY Valentino, who is working with Dr. Azar in [place] today.    Message routed to team.

## 2022-11-16 ENCOUNTER — DOCUMENTATION ONLY (OUTPATIENT)
Dept: ONCOLOGY | Facility: CLINIC | Age: 62
End: 2022-11-16

## 2022-11-16 NOTE — PROGRESS NOTES
RN Care Coordinator: Christianne Valentino; [phone]    Surgery is scheduled with Dr. Azar   Date: 3/16   Location: [facility]  Scheduled per patient request      H&P to be completed by Primary Care     COVID-19 test: patient to complete an at-home test 1-2 days prior to scheduled date and bring a picture of negative results or call [phone] option # 7 to schedule a PCR test within 4 days of the scheduled date     Post-op: will be scheduled by the clinic    Patient will receive a phone call from pre-admission nurses 1-2 days prior to surgery with arrival and start time.      Confirmed with patient via [employer].      Patient questions/concerns: N/A       Surgery packet was sent via [employer]    __    Hawa Navarro, Perioperative Coordinator, on 11/16/2022 on 4:55 PM  P: [phone]

## 2023-01-22 ENCOUNTER — HEALTH MAINTENANCE LETTER (OUTPATIENT)
Age: 63
End: 2023-01-22

## 2023-02-10 NOTE — H&P (VIEW-ONLY)
Tiffanie is a 62 year old  female who presents for annual exam.     Besides routine health maintenance, she has no other health concerns today .    HPI:  The patient's PCP is  Physician No Ref-Primary.  Patient here today for her annual GYN exam, mammogram Pap smear and preop.  She has a laparoscopy BSO scheduled in 1 month with Gyn Onc for a nodule on her right ovary.    She is postmenopausal and has had no bleeding.    She has noticed more rectal bleeding with bowel movements.  She had a negative colonoscopy in [facility] with [facility].      GYNECOLOGIC HISTORY:    No LMP recorded. Patient is postmenopausal.  Her current contraception method is: menopause.  She  reports that she has never smoked. She has never used smokeless tobacco.  Patient is sexually active.  STD testing offered?  Declined  Last PHQ-9 score on record =   PHQ-9 SCORE 2023   PHQ-9 Total Score 0     Last GAD7 score on record =   CHESTER-7 SCORE 2023   Total Score 0     Alcohol Score = 2    HEALTH MAINTENANCE:  Cholesterol:  Recent Labs   Lab Test 11/10/21  0909   CHOL 221*   HDL 72   *   TRIG 65     Last Mammo: One year ago, Result: Normal, Next Mammo: Today   Pap:   Lab Results   Component Value Date    GYNINTERP  2021     Negative for Intraepithelial Lesion or Malignancy (NILM)    PAP NIL 2018    PAP NIL 2017    PAP NIL 04/15/2016   HPV-  Colonoscopy:  16, Result: Normal, Next Colonoscopy: 10 years.  Dexa:  10/21/20    FINDINGS:               Lumbar Spine (L1-L4)      T-score:  0.6               Left Femoral Neck            T-score:  -1.5               Right Femoral Neck          T-score:  -1.5               Lumbar (L1-L4) BMD: 1.271  Previous: 1.323                          Total Hip Mean BMD: 0.935  Previous: 0.987  Health maintenance updated:  yes    HISTORY:  OB History    Para Term  AB Living   3 0 0 0 0 3   SAB IAB Ectopic Multiple Live Births   0 0 0 0 3      #  "Outcome Date GA Lbr Ino/2nd Weight Sex Delivery Anes PTL Lv   3             2             1                 Patient Active Problem List   Diagnosis     Perimenopausal symptoms     Insomnia     Osteopenia     Past Surgical History:   Procedure Laterality Date     EYE SURGERY Bilateral     lasik     SOFT TISSUE SURGERY Left 1992    lipoma on back      Social History     Tobacco Use     Smoking status: Never     Smokeless tobacco: Never   Substance Use Topics     Alcohol use: Yes     Comment: 3/wk      Problem (# of Occurrences) Relation (Name,Age of Onset)    Prostate Cancer (1) Father    Squamous cell carcinoma (1) Mother (88)    Ovarian Cancer (1) Sister            Current Outpatient Medications   Medication Sig     calcium carbonate (OS-LALITHA) 500 MG tablet Take 1 tablet by mouth 2 times daily     Cholecalciferol (VITAMIN D3 PO) Take 1,000 Units by mouth daily     No current facility-administered medications for this visit.     No Known Allergies    Past medical, surgical, social and family histories were reviewed and updated in EPIC.    ROS:   12 point review of systems negative other than symptoms noted below or in the HPI.  Gastrointestinal: Blood in Stools  No urinary frequency or dysuria, bladder or kidney problems    EXAM:  /64   Ht 1.566 m (5' 1.65\")   Wt 69.6 kg (153 lb 6.4 oz)   BMI 28.37 kg/m     BMI: Body mass index is 28.37 kg/m .    PHYSICAL EXAM:  Constitutional:   Appearance: Well nourished, well developed, alert, in no acute distress  Neck:  Lymph Nodes:  No lymphadenopathy present    Thyroid:  Gland size normal, nontender, no nodules or masses present  on palpation  Chest:  Respiratory Effort:  Breathing unlabored  Cardiovascular:    Heart: Auscultation:  Regular rate, normal rhythm, no murmurs present  Breasts: Inspection of Breasts:  No lymphadenopathy present., Palpation of Breasts and Axillae:  No masses present on palpation, no breast tenderness., Axillary Lymph " Nodes:  No lymphadenopathy present. and No nodularity, asymmetry or nipple discharge bilaterally.  Gastrointestinal:   Abdominal Examination:  Abdomen nontender to palpation, tone normal without rigidity or guarding, no masses present, umbilicus without lesions   Liver and Spleen:  No hepatomegaly present, liver nontender to palpation    Hernias:  No hernias present  Lymphatic: Lymph Nodes:  No other lymphadenopathy present  Skin:  General Inspection:  No rashes present, no lesions present, no areas of  discoloration  Neurologic:    Mental Status:  Oriented X3.  Normal strength and tone, sensory exam                grossly normal, mentation intact and speech normal.    Psychiatric:   Mentation appears normal and affect normal/bright.         Pelvic Exam:  External Genitalia:     Normal appearance for age, no discharge present, no tenderness present, no inflammatory lesions present, color normal  Vagina:     Normal vaginal vault without central or paravaginal defects, moderate amount of thin creamy white discharge present, no inflammatory lesions present, no masses present  Bladder:     Nontender to palpation  Urethra:   Urethral Body:  Urethra palpation normal, urethra structural support normal   Urethral Meatus:  No erythema or lesions present  Cervix:     Appearance healthy, no lesions present, nontender to palpation, no bleeding present.  Friable with Pap collection  Uterus:     Uterus: firm, normal sized and nontender, anteverted in position.   Adnexa:     No adnexal tenderness present, no adnexal masses present  Perineum:     Perineum within normal limits, no evidence of trauma, no rashes or skin lesions present  Anus:     Anus within normal limits, no hemorrhoids present  Inguinal Lymph Nodes:     No lymphadenopathy present  Pubic Hair:     Normal pubic hair distribution for age  Genitalia and Groin:     No rashes present, no lesions present, no areas of discoloration, no masses present  Rectal exam was  negative.      COUNSELING:   Special attention given to:        Regular exercise       Healthy diet/nutrition       Colorectal Cancer Screening       (Colleen)menopause management    BMI: Body mass index is 28.37 kg/m .      ASSESSMENT:  62 year old female with satisfactory annual exam.    ICD-10-CM    1. Encounter for gynecological examination without abnormal finding  Z01.419       2. Screening for HIV (human immunodeficiency virus)  Z11.4 HIV Antigen Antibody Combo     HIV Antigen Antibody Combo      3. Screen for colon cancer  Z12.11       4. Screening for cardiovascular condition  Z13.6 Lipid Profile     Lipid Profile      5. Encounter for vitamin deficiency screening  Z13.21 Vitamin D Deficiency     Vitamin D Deficiency      6. Screening for diabetes mellitus  Z13.1 Glucose whole blood     Glucose whole blood      7. Screening for cervical cancer  Z12.4 Pap thin layer screen with HPV - recommended age 30 - 65 years      8. Need for hepatitis C screening test  Z11.59 Hepatitis C antibody     Hepatitis C antibody      9. Vaginal discharge  N89.8 Wet prep - Clinic Collect      10. Screening for disorder of blood and blood-forming organs  Z13.0 CBC with platelets     CBC with platelets      11. Blood in stool  K92.1 Fecal colorectal cancer screen (FIT)     Fecal colorectal cancer screen (FIT)      12. Preop examination  Z01.818       13. Pelvic mass  R19.00           PLAN:  62-year-old postmenopausal female with possible vaginitis.  Wet prep: negative.   Pap smear was collected and if it is normal she can repeat in 1 year.  With her rectal exam being negative I did encourage her to [facility] for further evaluation of blood in her stools.  We also send her home with a fit test to complete.      She is cleared for general anesthesia.  The consent is per the performing physician.    ALTAGRACIA Winslow CNP

## 2023-02-16 ENCOUNTER — OFFICE VISIT (OUTPATIENT)
Dept: OBGYN | Facility: CLINIC | Age: 63
End: 2023-02-16
Payer: COMMERCIAL

## 2023-02-16 ENCOUNTER — ANCILLARY PROCEDURE (OUTPATIENT)
Dept: MAMMOGRAPHY | Facility: CLINIC | Age: 63
End: 2023-02-16
Payer: COMMERCIAL

## 2023-02-16 VITALS
SYSTOLIC BLOOD PRESSURE: 112 MMHG | WEIGHT: 153.4 LBS | BODY MASS INDEX: 28.23 KG/M2 | DIASTOLIC BLOOD PRESSURE: 64 MMHG | HEIGHT: 62 IN

## 2023-02-16 DIAGNOSIS — Z12.31 VISIT FOR SCREENING MAMMOGRAM: ICD-10-CM

## 2023-02-16 DIAGNOSIS — Z12.11 SCREEN FOR COLON CANCER: ICD-10-CM

## 2023-02-16 DIAGNOSIS — Z01.818 PREOP EXAMINATION: ICD-10-CM

## 2023-02-16 DIAGNOSIS — K92.1 BLOOD IN STOOL: ICD-10-CM

## 2023-02-16 DIAGNOSIS — Z11.59 NEED FOR HEPATITIS C SCREENING TEST: ICD-10-CM

## 2023-02-16 DIAGNOSIS — R19.00 PELVIC MASS: ICD-10-CM

## 2023-02-16 DIAGNOSIS — Z13.6 SCREENING FOR CARDIOVASCULAR CONDITION: ICD-10-CM

## 2023-02-16 DIAGNOSIS — R73.09 ELEVATED GLUCOSE: ICD-10-CM

## 2023-02-16 DIAGNOSIS — N89.8 VAGINAL DISCHARGE: ICD-10-CM

## 2023-02-16 DIAGNOSIS — Z11.4 SCREENING FOR HIV (HUMAN IMMUNODEFICIENCY VIRUS): ICD-10-CM

## 2023-02-16 DIAGNOSIS — Z13.21 ENCOUNTER FOR VITAMIN DEFICIENCY SCREENING: ICD-10-CM

## 2023-02-16 DIAGNOSIS — Z12.4 SCREENING FOR CERVICAL CANCER: ICD-10-CM

## 2023-02-16 DIAGNOSIS — Z13.1 SCREENING FOR DIABETES MELLITUS: ICD-10-CM

## 2023-02-16 DIAGNOSIS — Z13.0 SCREENING FOR DISORDER OF BLOOD AND BLOOD-FORMING ORGANS: ICD-10-CM

## 2023-02-16 DIAGNOSIS — Z01.419 ENCOUNTER FOR GYNECOLOGICAL EXAMINATION WITHOUT ABNORMAL FINDING: Primary | ICD-10-CM

## 2023-02-16 LAB
CHOLEST SERPL-MCNC: 191 MG/DL
CLUE CELLS: ABNORMAL
ERYTHROCYTE [DISTWIDTH] IN BLOOD BY AUTOMATED COUNT: 12.8 % (ref 10–15)
GLUCOSE BLD-MCNC: 103 MG/DL (ref 60–99)
HBA1C MFR BLD: 5.6 % (ref 0–5.6)
HCT VFR BLD AUTO: 39.8 % (ref 35–47)
HDLC SERPL-MCNC: 62 MG/DL
HGB BLD-MCNC: 12.9 G/DL (ref 11.7–15.7)
LDLC SERPL CALC-MCNC: 116 MG/DL
MCH RBC QN AUTO: 28.7 PG (ref 26.5–33)
MCHC RBC AUTO-ENTMCNC: 32.4 G/DL (ref 31.5–36.5)
MCV RBC AUTO: 88 FL (ref 78–100)
NONHDLC SERPL-MCNC: 129 MG/DL
PLATELET # BLD AUTO: 276 10E3/UL (ref 150–450)
RBC # BLD AUTO: 4.5 10E6/UL (ref 3.8–5.2)
TRICHOMONAS, WET PREP: ABNORMAL
TRIGL SERPL-MCNC: 63 MG/DL
WBC # BLD AUTO: 6.7 10E3/UL (ref 4–11)
WBC'S/HIGH POWER FIELD, WET PREP: ABNORMAL
YEAST, WET PREP: ABNORMAL

## 2023-02-16 PROCEDURE — 83036 HEMOGLOBIN GLYCOSYLATED A1C: CPT | Performed by: NURSE PRACTITIONER

## 2023-02-16 PROCEDURE — 82306 VITAMIN D 25 HYDROXY: CPT | Performed by: NURSE PRACTITIONER

## 2023-02-16 PROCEDURE — G0145 SCR C/V CYTO,THINLAYER,RESCR: HCPCS | Performed by: NURSE PRACTITIONER

## 2023-02-16 PROCEDURE — 82947 ASSAY GLUCOSE BLOOD QUANT: CPT | Performed by: NURSE PRACTITIONER

## 2023-02-16 PROCEDURE — 77063 BREAST TOMOSYNTHESIS BI: CPT | Mod: TC | Performed by: RADIOLOGY

## 2023-02-16 PROCEDURE — 36415 COLL VENOUS BLD VENIPUNCTURE: CPT | Performed by: NURSE PRACTITIONER

## 2023-02-16 PROCEDURE — 86803 HEPATITIS C AB TEST: CPT | Performed by: NURSE PRACTITIONER

## 2023-02-16 PROCEDURE — 77067 SCR MAMMO BI INCL CAD: CPT | Mod: TC | Performed by: RADIOLOGY

## 2023-02-16 PROCEDURE — 99396 PREV VISIT EST AGE 40-64: CPT | Performed by: NURSE PRACTITIONER

## 2023-02-16 PROCEDURE — 87210 SMEAR WET MOUNT SALINE/INK: CPT | Performed by: NURSE PRACTITIONER

## 2023-02-16 PROCEDURE — 80061 LIPID PANEL: CPT | Performed by: NURSE PRACTITIONER

## 2023-02-16 PROCEDURE — 87389 HIV-1 AG W/HIV-1&-2 AB AG IA: CPT | Performed by: NURSE PRACTITIONER

## 2023-02-16 PROCEDURE — 87624 HPV HI-RISK TYP POOLED RSLT: CPT | Performed by: NURSE PRACTITIONER

## 2023-02-16 PROCEDURE — 85027 COMPLETE CBC AUTOMATED: CPT | Performed by: NURSE PRACTITIONER

## 2023-02-16 ASSESSMENT — ANXIETY QUESTIONNAIRES
GAD7 TOTAL SCORE: 0
3. WORRYING TOO MUCH ABOUT DIFFERENT THINGS: NOT AT ALL
GAD7 TOTAL SCORE: 0
6. BECOMING EASILY ANNOYED OR IRRITABLE: NOT AT ALL
IF YOU CHECKED OFF ANY PROBLEMS ON THIS QUESTIONNAIRE, HOW DIFFICULT HAVE THESE PROBLEMS MADE IT FOR YOU TO DO YOUR WORK, TAKE CARE OF THINGS AT HOME, OR GET ALONG WITH OTHER PEOPLE: NOT DIFFICULT AT ALL
1. FEELING NERVOUS, ANXIOUS, OR ON EDGE: NOT AT ALL
5. BEING SO RESTLESS THAT IT IS HARD TO SIT STILL: NOT AT ALL
7. FEELING AFRAID AS IF SOMETHING AWFUL MIGHT HAPPEN: NOT AT ALL
2. NOT BEING ABLE TO STOP OR CONTROL WORRYING: NOT AT ALL

## 2023-02-16 ASSESSMENT — PATIENT HEALTH QUESTIONNAIRE - PHQ9
5. POOR APPETITE OR OVEREATING: NOT AT ALL
SUM OF ALL RESPONSES TO PHQ QUESTIONS 1-9: 0

## 2023-02-17 LAB
DEPRECATED CALCIDIOL+CALCIFEROL SERPL-MC: 34 UG/L (ref 20–75)
HCV AB SERPL QL IA: NONREACTIVE
HIV 1+2 AB+HIV1 P24 AG SERPL QL IA: NONREACTIVE

## 2023-02-19 PROCEDURE — 82274 ASSAY TEST FOR BLOOD FECAL: CPT | Performed by: NURSE PRACTITIONER

## 2023-02-21 LAB — HEMOCCULT STL QL IA: NEGATIVE

## 2023-03-11 NOTE — PROGRESS NOTES
SUBJECTIVE:                                                   Tiffanie Castrejon is a 61 year old female who presents to clinic today for the following health issue(s):  Patient presents with:  Ultrasound: US f/u for Pelvic mass       HPI:  Patient here today for herRoutine mammogram as well as ultrasound follow-up of a pelvic mass.  She has a family history of ovarian cancer in a sister.  She has no postmenopausal bleeding and is not on any hormone replacement therapy at this time.  She had genetic testing done last year that was negative.    She had a transvaginal ultrasound in 2021 showing a right ovarian mass versus a fibroid on the right ovary measuring 1.6 x 1.2 cm.  This was followed up by a pelvic MRI a month later and the mass was felt to represent a small subserosal fibroid.    No LMP recorded. Patient is postmenopausal..     Patient is sexually active, .  Using vasectomy and menopause for contraception.    reports that she has never smoked. She has never used smokeless tobacco.    STD testing offered?  Declined    Health maintenance updated:  yes    Today's PHQ-2 Score:   PHQ-2 (  [employer]) 2015   Q1: Little interest or pleasure in doing things 0   Q2: Feeling down, depressed or hopeless 0   PHQ-2 Score 0     Today's PHQ-9 Score:   PHQ-9 SCORE 2021   PHQ-9 Total Score 0     Today's CHESTER-7 Score:   CHESTER-7 SCORE 2021   Total Score 0       Problem list and histories reviewed & adjusted, as indicated.  Additional history: as documented.    Patient Active Problem List   Diagnosis     Perimenopausal symptoms     Insomnia     Osteopenia     Past Surgical History:   Procedure Laterality Date     EYE SURGERY Bilateral     lasik     SOFT TISSUE SURGERY Left     lipoma on back      Social History     Tobacco Use     Smoking status: Never Smoker     Smokeless tobacco: Never Used   Substance Use Topics     Alcohol use: Yes     Comment: 3/wk      Problem (# of Occurrences) Relation  · Continue lipitor "(Name,Age of Onset)    Ovarian Cancer (1) Sister    Prostate Cancer (1) Father    Squamous cell carcinoma (1) Mother (88)            Current Outpatient Medications   Medication Sig     calcium carbonate (OS-LALITHA) 500 MG tablet Take 1 tablet by mouth 2 times daily     Cholecalciferol (VITAMIN D3 PO) Take 1,000 Units by mouth daily     No current facility-administered medications for this visit.     No Known Allergies    ROS:  12 point review of systems negative other than symptoms noted below or in the HPI.  No urinary frequency or dysuria, bladder or kidney problems      OBJECTIVE:     BP 92/66   Ht 1.568 m (5' 1.75\")   Wt 71.5 kg (157 lb 9.6 oz)   Breastfeeding No   BMI 29.06 kg/m    Body mass index is 29.06 kg/m .    Exam:  Constitutional:  Appearance: Well nourished, well developed alert, in no acute distress  Psychiatric:  Mentation appears normal and affect normal/bright.     In-Clinic Test Results:  Results for orders placed or performed in visit on 03/23/22 (from the past 24 hour(s))   US Transvaginal Pelvic Non-OB    Narrative    Gynecological Ultrasound Report  Pelvic U/S - Transvaginal  [facility]  Referring Provider: Lila Darling NP  Sonographer:  Krysta Muñiz RDMS  Indication: F/U right pelvic mass  LMP: No LMP recorded. Patient is postmenopausal.  History:   Gynecological Ultrasonography:   Uterus: anteverted. Contour is smooth/regular.  Size: 6.31 x 4.90 x 3.91 cm  Endometrium: Thickness Total 1.80 mm  Findings: Thin  Right Ovary: 1.88 x 1.31 x 1.20 cm. Right adnexal mass unchanged 1.3x 1.4x   1.1cm  Left Ovary: 1.92 x 1.18 x .94 cm. Wnl  Cul de Sac Free Fluid: No free fluid     Impression:     Uterus normal with thin endometrial lining.  Left ovary normal.  Right adnexal mass unchanged from prior imaging; fibroid vs right adnexal   mass measuring 1.3x1.4x1.1cm.   No free fluid.    Opal Wilson MD         ASSESSMENT/PLAN:                                                  "       ICD-10-CM    1. Family history of malignant neoplasm of ovary in first degree relative  Z80.41    2. Pelvic mass  R19.00        There are no Patient Instructions on file for this visit.    Ultrasound today shows a persistent right adnexal mass measuring 1.3 x 1.4 cm.  Her uterus and left ovary are unremarkable.    We did recommend a gyn/oncology consult.      ALTAGRACIA Winslow [facility]

## 2023-03-15 ENCOUNTER — ANESTHESIA EVENT (OUTPATIENT)
Dept: SURGERY | Facility: CLINIC | Age: 63
End: 2023-03-15
Payer: COMMERCIAL

## 2023-03-16 ENCOUNTER — TELEPHONE (OUTPATIENT)
Dept: ONCOLOGY | Facility: CLINIC | Age: 63
End: 2023-03-16

## 2023-03-16 ENCOUNTER — HOSPITAL ENCOUNTER (OUTPATIENT)
Facility: CLINIC | Age: 63
Discharge: HOME | End: 2023-03-16
Attending: OBSTETRICS & GYNECOLOGY | Admitting: OBSTETRICS & GYNECOLOGY
Payer: COMMERCIAL

## 2023-03-16 ENCOUNTER — ANESTHESIA (OUTPATIENT)
Dept: SURGERY | Facility: CLINIC | Age: 63
End: 2023-03-16
Payer: COMMERCIAL

## 2023-03-16 VITALS
SYSTOLIC BLOOD PRESSURE: 125 MMHG | HEART RATE: 60 BPM | OXYGEN SATURATION: 97 % | HEIGHT: 62 IN | WEIGHT: 153 LBS | RESPIRATION RATE: 12 BRPM | BODY MASS INDEX: 28.16 KG/M2 | TEMPERATURE: 97.3 F | DIASTOLIC BLOOD PRESSURE: 67 MMHG

## 2023-03-16 DIAGNOSIS — Z98.890 S/P LAPAROSCOPY: Primary | ICD-10-CM

## 2023-03-16 LAB
ABO/RH(D): NORMAL
ANTIBODY SCREEN: NEGATIVE
SPECIMEN EXPIRATION DATE: NORMAL

## 2023-03-16 PROCEDURE — 88112 CYTOPATH CELL ENHANCE TECH: CPT | Mod: 26 | Performed by: PATHOLOGY

## 2023-03-16 PROCEDURE — 86901 BLOOD TYPING SEROLOGIC RH(D): CPT | Performed by: OBSTETRICS & GYNECOLOGY

## 2023-03-16 PROCEDURE — 250N000009 HC RX 250: Performed by: NURSE ANESTHETIST, CERTIFIED REGISTERED

## 2023-03-16 PROCEDURE — 360N000076 HC SURGERY LEVEL 3, PER MIN: Performed by: OBSTETRICS & GYNECOLOGY

## 2023-03-16 PROCEDURE — 88305 TISSUE EXAM BY PATHOLOGIST: CPT | Mod: 26 | Performed by: PATHOLOGY

## 2023-03-16 PROCEDURE — 88307 TISSUE EXAM BY PATHOLOGIST: CPT | Mod: 26 | Performed by: PATHOLOGY

## 2023-03-16 PROCEDURE — 370N000017 HC ANESTHESIA TECHNICAL FEE, PER MIN: Performed by: OBSTETRICS & GYNECOLOGY

## 2023-03-16 PROCEDURE — 250N000011 HC RX IP 250 OP 636: Performed by: ANESTHESIOLOGY

## 2023-03-16 PROCEDURE — 88331 PATH CONSLTJ SURG 1 BLK 1SPC: CPT | Mod: TC | Performed by: OBSTETRICS & GYNECOLOGY

## 2023-03-16 PROCEDURE — 250N000013 HC RX MED GY IP 250 OP 250 PS 637: Performed by: OBSTETRICS & GYNECOLOGY

## 2023-03-16 PROCEDURE — 250N000011 HC RX IP 250 OP 636: Performed by: OBSTETRICS & GYNECOLOGY

## 2023-03-16 PROCEDURE — 272N000001 HC OR GENERAL SUPPLY STERILE: Performed by: OBSTETRICS & GYNECOLOGY

## 2023-03-16 PROCEDURE — 710N000012 HC RECOVERY PHASE 2, PER MINUTE: Performed by: OBSTETRICS & GYNECOLOGY

## 2023-03-16 PROCEDURE — 999N000141 HC STATISTIC PRE-PROCEDURE NURSING ASSESSMENT: Performed by: OBSTETRICS & GYNECOLOGY

## 2023-03-16 PROCEDURE — 258N000003 HC RX IP 258 OP 636: Performed by: ANESTHESIOLOGY

## 2023-03-16 PROCEDURE — 250N000025 HC SEVOFLURANE, PER MIN: Performed by: OBSTETRICS & GYNECOLOGY

## 2023-03-16 PROCEDURE — 86850 RBC ANTIBODY SCREEN: CPT | Performed by: OBSTETRICS & GYNECOLOGY

## 2023-03-16 PROCEDURE — 88331 PATH CONSLTJ SURG 1 BLK 1SPC: CPT | Mod: 26 | Performed by: PATHOLOGY

## 2023-03-16 PROCEDURE — 258N000003 HC RX IP 258 OP 636: Performed by: NURSE ANESTHETIST, CERTIFIED REGISTERED

## 2023-03-16 PROCEDURE — 250N000011 HC RX IP 250 OP 636: Performed by: NURSE ANESTHETIST, CERTIFIED REGISTERED

## 2023-03-16 PROCEDURE — 88112 CYTOPATH CELL ENHANCE TECH: CPT | Mod: TC | Performed by: OBSTETRICS & GYNECOLOGY

## 2023-03-16 PROCEDURE — 710N000009 HC RECOVERY PHASE 1, LEVEL 1, PER MIN: Performed by: OBSTETRICS & GYNECOLOGY

## 2023-03-16 PROCEDURE — 36415 COLL VENOUS BLD VENIPUNCTURE: CPT | Performed by: OBSTETRICS & GYNECOLOGY

## 2023-03-16 RX ORDER — ONDANSETRON 2 MG/ML
4 INJECTION INTRAMUSCULAR; INTRAVENOUS EVERY 30 MIN PRN
Status: DISCONTINUED | OUTPATIENT
Start: 2023-03-16 | End: 2023-03-16 | Stop reason: HOSPADM

## 2023-03-16 RX ORDER — LIDOCAINE HYDROCHLORIDE 20 MG/ML
INJECTION, SOLUTION INFILTRATION; PERINEURAL PRN
Status: DISCONTINUED | OUTPATIENT
Start: 2023-03-16 | End: 2023-03-16

## 2023-03-16 RX ORDER — BUPIVACAINE HYDROCHLORIDE 2.5 MG/ML
INJECTION, SOLUTION EPIDURAL; INFILTRATION; INTRACAUDAL PRN
Status: DISCONTINUED | OUTPATIENT
Start: 2023-03-16 | End: 2023-03-16 | Stop reason: HOSPADM

## 2023-03-16 RX ORDER — HYDROMORPHONE HCL IN WATER/PF 6 MG/30 ML
0.4 PATIENT CONTROLLED ANALGESIA SYRINGE INTRAVENOUS EVERY 5 MIN PRN
Status: DISCONTINUED | OUTPATIENT
Start: 2023-03-16 | End: 2023-03-16 | Stop reason: HOSPADM

## 2023-03-16 RX ORDER — LIDOCAINE 40 MG/G
CREAM TOPICAL
Status: DISCONTINUED | OUTPATIENT
Start: 2023-03-16 | End: 2023-03-16 | Stop reason: HOSPADM

## 2023-03-16 RX ORDER — DEXAMETHASONE SODIUM PHOSPHATE 4 MG/ML
INJECTION, SOLUTION INTRA-ARTICULAR; INTRALESIONAL; INTRAMUSCULAR; INTRAVENOUS; SOFT TISSUE PRN
Status: DISCONTINUED | OUTPATIENT
Start: 2023-03-16 | End: 2023-03-16

## 2023-03-16 RX ORDER — NEOSTIGMINE METHYLSULFATE 1 MG/ML
VIAL (ML) INJECTION PRN
Status: DISCONTINUED | OUTPATIENT
Start: 2023-03-16 | End: 2023-03-16

## 2023-03-16 RX ORDER — IBUPROFEN 600 MG/1
600 TABLET, FILM COATED ORAL ONCE
Status: DISCONTINUED | OUTPATIENT
Start: 2023-03-16 | End: 2023-03-16 | Stop reason: HOSPADM

## 2023-03-16 RX ORDER — ACETAMINOPHEN 325 MG/1
975 TABLET ORAL ONCE
Status: DISCONTINUED | OUTPATIENT
Start: 2023-03-16 | End: 2023-03-16 | Stop reason: HOSPADM

## 2023-03-16 RX ORDER — ONDANSETRON 2 MG/ML
INJECTION INTRAMUSCULAR; INTRAVENOUS PRN
Status: DISCONTINUED | OUTPATIENT
Start: 2023-03-16 | End: 2023-03-16

## 2023-03-16 RX ORDER — LABETALOL HYDROCHLORIDE 5 MG/ML
10 INJECTION, SOLUTION INTRAVENOUS
Status: DISCONTINUED | OUTPATIENT
Start: 2023-03-16 | End: 2023-03-16 | Stop reason: HOSPADM

## 2023-03-16 RX ORDER — CEFAZOLIN SODIUM/WATER 2 G/20 ML
2 SYRINGE (ML) INTRAVENOUS SEE ADMIN INSTRUCTIONS
Status: DISCONTINUED | OUTPATIENT
Start: 2023-03-16 | End: 2023-03-16 | Stop reason: HOSPADM

## 2023-03-16 RX ORDER — CEFAZOLIN SODIUM/WATER 2 G/20 ML
2 SYRINGE (ML) INTRAVENOUS
Status: COMPLETED | OUTPATIENT
Start: 2023-03-16 | End: 2023-03-16

## 2023-03-16 RX ORDER — PROPOFOL 10 MG/ML
INJECTION, EMULSION INTRAVENOUS PRN
Status: DISCONTINUED | OUTPATIENT
Start: 2023-03-16 | End: 2023-03-16

## 2023-03-16 RX ORDER — FENTANYL CITRATE 0.05 MG/ML
25 INJECTION, SOLUTION INTRAMUSCULAR; INTRAVENOUS EVERY 5 MIN PRN
Status: DISCONTINUED | OUTPATIENT
Start: 2023-03-16 | End: 2023-03-16 | Stop reason: HOSPADM

## 2023-03-16 RX ORDER — OXYCODONE HYDROCHLORIDE 5 MG/1
5 TABLET ORAL EVERY 6 HOURS PRN
Qty: 6 TABLET | Refills: 0 | Status: SHIPPED | OUTPATIENT
Start: 2023-03-16 | End: 2023-03-19

## 2023-03-16 RX ORDER — OXYCODONE HYDROCHLORIDE 5 MG/1
5 TABLET ORAL
Status: DISCONTINUED | OUTPATIENT
Start: 2023-03-16 | End: 2023-03-16 | Stop reason: HOSPADM

## 2023-03-16 RX ORDER — FENTANYL CITRATE 0.05 MG/ML
50 INJECTION, SOLUTION INTRAMUSCULAR; INTRAVENOUS EVERY 5 MIN PRN
Status: DISCONTINUED | OUTPATIENT
Start: 2023-03-16 | End: 2023-03-16 | Stop reason: HOSPADM

## 2023-03-16 RX ORDER — FENTANYL CITRATE 50 UG/ML
INJECTION, SOLUTION INTRAMUSCULAR; INTRAVENOUS PRN
Status: DISCONTINUED | OUTPATIENT
Start: 2023-03-16 | End: 2023-03-16

## 2023-03-16 RX ORDER — ONDANSETRON 4 MG/1
4 TABLET, ORALLY DISINTEGRATING ORAL EVERY 30 MIN PRN
Status: DISCONTINUED | OUTPATIENT
Start: 2023-03-16 | End: 2023-03-16 | Stop reason: HOSPADM

## 2023-03-16 RX ORDER — HYDROMORPHONE HCL IN WATER/PF 6 MG/30 ML
0.2 PATIENT CONTROLLED ANALGESIA SYRINGE INTRAVENOUS EVERY 5 MIN PRN
Status: DISCONTINUED | OUTPATIENT
Start: 2023-03-16 | End: 2023-03-16 | Stop reason: HOSPADM

## 2023-03-16 RX ORDER — AMOXICILLIN 250 MG
1-2 CAPSULE ORAL 2 TIMES DAILY
Qty: 30 TABLET | Refills: 0 | Status: SHIPPED | OUTPATIENT
Start: 2023-03-16 | End: 2024-02-28

## 2023-03-16 RX ORDER — ACETAMINOPHEN 325 MG/1
975 TABLET ORAL ONCE
Status: COMPLETED | OUTPATIENT
Start: 2023-03-16 | End: 2023-03-16

## 2023-03-16 RX ORDER — GLYCOPYRROLATE 0.2 MG/ML
INJECTION, SOLUTION INTRAMUSCULAR; INTRAVENOUS PRN
Status: DISCONTINUED | OUTPATIENT
Start: 2023-03-16 | End: 2023-03-16

## 2023-03-16 RX ORDER — SODIUM CHLORIDE, SODIUM LACTATE, POTASSIUM CHLORIDE, CALCIUM CHLORIDE 600; 310; 30; 20 MG/100ML; MG/100ML; MG/100ML; MG/100ML
INJECTION, SOLUTION INTRAVENOUS CONTINUOUS
Status: DISCONTINUED | OUTPATIENT
Start: 2023-03-16 | End: 2023-03-16 | Stop reason: HOSPADM

## 2023-03-16 RX ORDER — SODIUM CHLORIDE, SODIUM LACTATE, POTASSIUM CHLORIDE, CALCIUM CHLORIDE 600; 310; 30; 20 MG/100ML; MG/100ML; MG/100ML; MG/100ML
INJECTION, SOLUTION INTRAVENOUS CONTINUOUS PRN
Status: DISCONTINUED | OUTPATIENT
Start: 2023-03-16 | End: 2023-03-16

## 2023-03-16 RX ORDER — IBUPROFEN 600 MG/1
600 TABLET, FILM COATED ORAL EVERY 6 HOURS PRN
Qty: 12 TABLET | Refills: 0 | Status: SHIPPED | OUTPATIENT
Start: 2023-03-16 | End: 2024-02-28

## 2023-03-16 RX ORDER — PROPOFOL 10 MG/ML
INJECTION, EMULSION INTRAVENOUS CONTINUOUS PRN
Status: DISCONTINUED | OUTPATIENT
Start: 2023-03-16 | End: 2023-03-16

## 2023-03-16 RX ORDER — ACETAMINOPHEN 325 MG/1
650 TABLET ORAL EVERY 6 HOURS PRN
Qty: 24 TABLET | Refills: 0 | Status: SHIPPED | OUTPATIENT
Start: 2023-03-16 | End: 2024-02-28

## 2023-03-16 RX ORDER — KETOROLAC TROMETHAMINE 30 MG/ML
INJECTION, SOLUTION INTRAMUSCULAR; INTRAVENOUS PRN
Status: DISCONTINUED | OUTPATIENT
Start: 2023-03-16 | End: 2023-03-16

## 2023-03-16 RX ADMIN — FENTANYL CITRATE 50 MCG: 50 INJECTION, SOLUTION INTRAMUSCULAR; INTRAVENOUS at 08:07

## 2023-03-16 RX ADMIN — ROCURONIUM BROMIDE 50 MG: 50 INJECTION, SOLUTION INTRAVENOUS at 07:44

## 2023-03-16 RX ADMIN — NEOSTIGMINE METHYLSULFATE 3.5 MG: 1 INJECTION, SOLUTION INTRAVENOUS at 08:41

## 2023-03-16 RX ADMIN — DEXAMETHASONE SODIUM PHOSPHATE 8 MG: 4 INJECTION, SOLUTION INTRA-ARTICULAR; INTRALESIONAL; INTRAMUSCULAR; INTRAVENOUS; SOFT TISSUE at 07:55

## 2023-03-16 RX ADMIN — ONDANSETRON 4 MG: 2 INJECTION INTRAMUSCULAR; INTRAVENOUS at 10:11

## 2023-03-16 RX ADMIN — ACETAMINOPHEN 975 MG: 325 TABLET ORAL at 10:00

## 2023-03-16 RX ADMIN — HYDROMORPHONE HYDROCHLORIDE 0.5 MG: 1 INJECTION, SOLUTION INTRAMUSCULAR; INTRAVENOUS; SUBCUTANEOUS at 08:14

## 2023-03-16 RX ADMIN — ONDANSETRON 4 MG: 2 INJECTION INTRAMUSCULAR; INTRAVENOUS at 08:33

## 2023-03-16 RX ADMIN — GLYCOPYRROLATE 0.4 MG: 0.2 INJECTION, SOLUTION INTRAMUSCULAR; INTRAVENOUS at 08:41

## 2023-03-16 RX ADMIN — PROPOFOL 50 MCG/KG/MIN: 10 INJECTION, EMULSION INTRAVENOUS at 07:44

## 2023-03-16 RX ADMIN — KETOROLAC TROMETHAMINE 30 MG: 30 INJECTION, SOLUTION INTRAMUSCULAR at 08:33

## 2023-03-16 RX ADMIN — SODIUM CHLORIDE, POTASSIUM CHLORIDE, SODIUM LACTATE AND CALCIUM CHLORIDE: 600; 310; 30; 20 INJECTION, SOLUTION INTRAVENOUS at 09:25

## 2023-03-16 RX ADMIN — LIDOCAINE HYDROCHLORIDE 80 MG: 20 INJECTION, SOLUTION INFILTRATION; PERINEURAL at 07:44

## 2023-03-16 RX ADMIN — SODIUM CHLORIDE, POTASSIUM CHLORIDE, SODIUM LACTATE AND CALCIUM CHLORIDE: 600; 310; 30; 20 INJECTION, SOLUTION INTRAVENOUS at 07:37

## 2023-03-16 RX ADMIN — MIDAZOLAM 2 MG: 1 INJECTION INTRAMUSCULAR; INTRAVENOUS at 07:37

## 2023-03-16 RX ADMIN — Medication 2 G: at 07:37

## 2023-03-16 RX ADMIN — FENTANYL CITRATE 50 MCG: 50 INJECTION, SOLUTION INTRAMUSCULAR; INTRAVENOUS at 07:44

## 2023-03-16 RX ADMIN — PROPOFOL 200 MG: 10 INJECTION, EMULSION INTRAVENOUS at 07:44

## 2023-03-16 ASSESSMENT — ACTIVITIES OF DAILY LIVING (ADL)
ADLS_ACUITY_SCORE: 35

## 2023-03-16 ASSESSMENT — ENCOUNTER SYMPTOMS
SEIZURES: 0
DYSRHYTHMIAS: 0

## 2023-03-16 NOTE — ANESTHESIA PROCEDURE NOTES
Airway       Patient location during procedure: OR ([facility] - Operating Room or Procedural Area)       Procedure Start/Stop Times: 3/16/2023 7:46 AM  Staff -        Anesthesiologist:  Aj Philippe DO       CRNA: Lacy Morse APRN CRNA       Performed By: CRNA  Consent for Airway        Urgency: elective  Indications and Patient Condition       Indications for airway management: deven-procedural       Induction type:intravenous       Mask difficulty assessment: 1 - vent by mask    Final Airway Details       Final airway type: endotracheal airway       Successful airway: ETT - single  Endotracheal Airway Details        ETT size (mm): 7.0       Cuffed: yes       Successful intubation technique: direct laryngoscopy       DL Blade Type: MAC 3       Grade View of Cords: 1       Adjucts: stylet       Position: Right       Measured from: gums/teeth       Secured at (cm): 21       Bite block used: None    Post intubation assessment        Number of attempts at approach: 1       Number of other approaches attempted: 0       Secured with: pink tape       Ease of procedure: easy       Dentition: Intact and Unchanged    Medication(s) Administered   Medication Administration Time: 3/16/2023 7:46 AM

## 2023-03-16 NOTE — OP NOTE
Procedure Date: 03/16/2023    PREOPERATIVE DIAGNOSES:    1.  Pelvic mass.  2.  Family history of ovarian cancer.    POSTOPERATIVE DIAGNOSES:    1.  Pelvic mass.  2. Family history of ovarian cancer.    PROCEDURE PERFORMED:   Laparoscopic bilateral salpingo-oophorectomy.    SURGEON:  Dianne Azar MD    ASSISTANTS:   Dr. Aris Flores (OB/GYN resident).    ANESTHESIA:  General.    ESTIMATED BLOOD LOSS:  3 mL    COMPLICATIONS:  None.    SPECIMENS REMOVED:     1.  Pelvic washings.  2.  Bilateral fallopian tubes and ovaries.    INDICATIONS FOR PROCEDURE:  Ms. Castrejon is a 62-year-old patient with a family history notable for ovarian cancer.  She had multiple imaging studies, which suggested a solid small right pelvic mass.  CA-125 was normal.  She presented to the hospital for further surgical diagnosis and management.    FINDINGS:  On bimanual exam, she had normal external genitalia.  Her vagina was without lesions.  The cervix was smooth and normal.  The uterus was mobile.  On diagnostic laparoscopy, she had a moderate amount of omental adhesions in the left upper and right upper quadrants.  Her cecum was also completely adherent to the right pelvic sidewall consistent with possible previous appendicitis.  The appendix itself was not visualized.  Regarding her pelvic organs, she had a solid-appearing mass originating from her right ovary, which grossly appeared consistent with a fibroma.  Her uterus was normal appearing and her left tube and ovary were normal appearing.  She had no evidence of metastatic disease.  She had no ascites.    DESCRIPTION OF PROCEDURE:  After informed consent, the patient was brought to the operating room where general anesthesia was administered.  She was prepped and draped in the dorsal lithotomy position and a Bennett catheter was placed in her bladder.  A surgical timeout was performed, ensuring correct patient and procedure.  She was given Ancef for preoperative prophylactic  antibiotics.  SCDs were placed on her lower extremities.    We first started by placing a Veress needle through her umbilicus.  Opening pressure was 3.  Pneumoperitoneum was established.  I then placed a 10 mm trocar through the umbilicus with the Visiport technique without complication.  The abdomen and pelvis were inspected with the above findings noted.  Under direct visualization, a left lower quadrant and right lower quadrant 5 mm trocar was placed.    The patient was placed in steep Trendelenburg, right adnexa was identified.  The ureter was easily seen through the peritoneum.  The adnexa was retracted medially and using the LigaSure device, we transected the right IP as well as the right mesosalpinx and the right proximal fallopian tube and uteroovarian vascular pedicle.  The specimen was placed in the EndoCatch bag and removed through the midline port site.    In a similar fashion, the left adnexa was identified and retracted medially.  The left ureter was easily seen through the peritoneum.  The left IP was transected with the LigaSure device as well as the left mesosalpinx, the left proximal fallopian tube and the left uteroovarian vascular pedicle.  The specimen was also placed in an EndoCatch bag and removed through the midline port site.  The pedicles were inspected, irrigated and very hemostatic.    At this point, frozen section returned as benign, so no further surgical staging was performed.  It should be noted that I did collect pelvic washings prior to performing the BSO.  The patient was taken out of Trendelenburg.  The midline port site was closed with 2 interrupted 0 Vicryl sutures with a Willian-Gabriel technique.  The pneumoperitoneum was evacuated.  The trocars were removed and the skin was closed with 4-0 Vicryl in a subcuticular fashion.  Band-Aids were placed over the incisions.  The Bennett catheter was removed.  The patient was awoken from anesthesia and brought to recovery room in stable  condition.    Dianne Azar MD        D: 2023   T: 2023   MT: [id number]    Name:     BRANDEN SOTO  MRN:      [id number]        Account:        [id number]   :      1960           Procedure Date: 2023     Document: [id number]

## 2023-03-16 NOTE — TELEPHONE ENCOUNTER
3/16/23 [facility] would like to reschedule 10:00 appt to earlier or later same day. hesham

## 2023-03-16 NOTE — ANESTHESIA POSTPROCEDURE EVALUATION
Patient: Tiffanie Castrejon    Procedure: Procedure(s):  LAPAROSCOPIC BILATERAL SALPINGO-OOPHORECTOMY       Anesthesia Type:  General    Note:  Disposition: Inpatient   Postop Pain Control: Uneventful            Sign Out: Well controlled pain   PONV: No   Neuro/Psych: Uneventful            Sign Out: Acceptable/Baseline neuro status   Airway/Respiratory: Uneventful            Sign Out: Acceptable/Baseline resp. status   CV/Hemodynamics: Uneventful            Sign Out: Acceptable CV status; No obvious hypovolemia; No obvious fluid overload   Other NRE: NONE   DID A NON-ROUTINE EVENT OCCUR? No           Last vitals:  Vitals Value Taken Time   /71 03/16/23 1030   Temp 36.3  C (97.3  F) 03/16/23 1030   Pulse 60 03/16/23 1035   Resp 23 03/16/23 1035   SpO2 96 % 03/16/23 1033   Vitals shown include unvalidated device data.    Electronically Signed By: Aj Philippe DO  March 16, 2023  3:52 PM

## 2023-03-16 NOTE — INTERVAL H&P NOTE
"I have reviewed the surgical (or preoperative) H&P that is linked to this encounter, and examined the patient. There are no significant changes    Clinical Conditions Present on Arrival:  Clinically Significant Risk Factors Present on Admission                    # Overweight: Estimated body mass index is 27.98 kg/m  as calculated from the following:    Height as of this encounter: 1.575 m (5' 2\").    Weight as of this encounter: 69.4 kg (153 lb).       "

## 2023-03-16 NOTE — ANESTHESIA PREPROCEDURE EVALUATION
Anesthesia Pre-Procedure Evaluation    Patient: Tiffanie Castrejon   MRN: [id number] : 1960        Procedure : Procedure(s):  LAPAROSCOPIC BILATERAL SALPINGO-OOPHORECTOMY          Past Medical History:   Diagnosis Date     Hot flashes, menopausal      Insomnia     2/2 stress and menopause     Osteopenia      Pelvic mass      Uterine fibroid       Past Surgical History:   Procedure Laterality Date     EYE SURGERY Bilateral     lasik     SOFT TISSUE SURGERY Left 1992    lipoma on back      No Known Allergies   Social History     Tobacco Use     Smoking status: Never     Smokeless tobacco: Never   Substance Use Topics     Alcohol use: Yes     Comment: 3/wk      Wt Readings from Last 1 Encounters:   23 69.4 kg (153 lb)        Anesthesia Evaluation            ROS/MED HX  ENT/Pulmonary:    (-) asthma and sleep apnea   Neurologic:    (-) no seizures, no CVA and migraines   Cardiovascular:    (-) hypertension, CHF, arrhythmias, irregular heartbeat/palpitations and dyslipidemia   METS/Exercise Tolerance:     Hematologic:       Musculoskeletal: Comment: Osteopenia      GI/Hepatic:    (-) GERD and liver disease   Renal/Genitourinary: Comment: Imaging with evidence of ovarian mass, patient with family history of ovarian cancer. Likely fibroma given negative tumor markers.    History of uterine fibroids   (-) renal disease   Endo:    (-) Type II DM and thyroid disease   Psychiatric/Substance Use:       Infectious Disease:       Malignancy:       Other:            Physical Exam    Airway        Mallampati: I   TM distance: > 3 FB   Neck ROM: full   Mouth opening: > 3 cm    Respiratory Devices and Support         Dental           Cardiovascular   cardiovascular exam normal          Pulmonary   pulmonary exam normal        breath sounds clear to auscultation           OUTSIDE LABS:  CBC:   Lab Results   Component Value Date    WBC 6.7 2023    WBC 6.9 11/10/2021    HGB 12.9 2023    HGB 13.8 11/10/2021     HCT 39.8 02/16/2023    HCT 43.5 11/10/2021     02/16/2023     11/10/2021     BMP: No results found for: NA, POTASSIUM, CHLORIDE, CO2, BUN, CR, GLC  COAGS: No results found for: PTT, INR, FIBR  POC: No results found for: BGM, HCG, HCGS  HEPATIC: No results found for: ALBUMIN, PROTTOTAL, ALT, AST, GGT, ALKPHOS, BILITOTAL, BILIDIRECT, YVON  OTHER:   Lab Results   Component Value Date    A1C 5.6 02/16/2023    TSH 0.51 11/10/2021       Anesthesia Plan    ASA Status:  1   NPO Status:  NPO Appropriate    Anesthesia Type: General.     - Airway: ETT   Induction: Intravenous.   Maintenance: TIVA.        Consents    Anesthesia Plan(s) and associated risks, benefits, and realistic alternatives discussed. Questions answered and patient/representative(s) expressed understanding.    - Discussed:     - Discussed with:  Patient      - Extended Intubation/Ventilatory Support Discussed: No.      - Patient is DNR/DNI Status: No    Use of blood products discussed: No .     Postoperative Care    Pain management: IV analgesics, Oral pain medications, Multi-modal analgesia.   PONV prophylaxis: Ondansetron (or other 5HT-3), Dexamethasone or Solumedrol     Comments:                Aj Philippe DO

## 2023-03-16 NOTE — PROGRESS NOTES
"Gynecologic Oncology Postoperative Check Note  3/16/2023    S: Patient reports she is doing well postoperatively. Pain is well controlled with oral pain medications. Has some mild nausea, no emesis. Denies chest pain, shortness of breath, dizziness, or other concerns at this time. She is moving to phase 2 now and will be getting up to walk soon.    O:  Vitals:    03/16/23 0546   BP: 103/73   Pulse: 75   Resp: 18   Temp: 98.7  F (37.1  C)   TempSrc: Temporal   SpO2: 98%   Weight: 69.4 kg (153 lb)   Height: 1.575 m (5' 2\")     Gen: NAD  Cardio: RRR, S1/S2, no murmurs  Resp: CTAB, no wheezing or crackles  Abdomen: soft, appropriately tender, nondistended, incisions covered with bandaids that are c/d/i  Extremities: Non-tender, no edema    A/P: 62 year old POD#0 s/p laparoscopic BSO. Doing well in the immediate post op period. Prior to discharge, will ensure she can ambulate, tolerate PO, and void. Discussed post op expectations, restrictions, and return precautions.     Dz: R ovarian nodule, family history of ovarian cancer. Frozen pathology was benign.  FEN: Advance as tolerated  Pain: tylenol, ibuprofen, oxycodone PRN  Heme: Hgb 12.9 > EBL 3  CV/Pulm: VSS, breathing comfortably on room air  GI: prn stool softeners  : s/p murguia, will void prior to discharge.  ID: afebrile, s/p preop antibiotics  PPX: IS, encouraged ambulation    Dispo: discharge to home when meeting post op goals.     Aris Flores MD  OB/GYN PGY-3  03/16/2023 8:53 AM  Resident pager [phone]         "

## 2023-03-16 NOTE — OR NURSING
Patient at times spO2 will dip into 87-89%. When patient deep breaths or uses incentive spirometer spO2 jumps back up to 94-98% quickly. Patient states she sleeps with 1 liter nasal cannula of oxygen at home. She also states that at time she will use oxygen in the day if she feels she needs it. At rest at home she says her oxygen is around 91% on room air. Dr. Cueva aware of these circumstances and is okay with plans to discharge knowing patient has oxygen prn at home as well is good incentive spirometry use demonstration and result.

## 2023-03-16 NOTE — DISCHARGE INSTRUCTIONS
Today you received Toradol, an antiinflammatory medication similar to Ibuprofen.  You should not take other antiinflammatory medication, such as Ibuprofen, Motrin, Advil, Aleve, Naprosyn, etc until 2:30 pm.    Today you were given 975 mg of Tylenol at 10:00 am. The recommended daily maximum dose is 4000 mg.      Same Day Surgery Discharge Instructions for  Sedation and General Anesthesia     It's not unusual to feel dizzy, light-headed or faint for up to 24 hours after surgery or while taking pain medication.  If you have these symptoms: sit for a few minutes before standing and have someone assist you when you get up to walk or use the bathroom.    You should rest and relax for the next 24 hours. We recommend you make arrangements to have an adult stay with you for at least 24 hours after your discharge.  Avoid hazardous and strenuous activity.    DO NOT DRIVE any vehicle or operate mechanical equipment for 24 hours following the end of your surgery.  Even though you may feel normal, your reactions may be affected by the medication you have received.    Do not drink alcoholic beverages for 24 hours following surgery.     Slowly progress to your regular diet as you feel able. It's not unusual to feel nauseated and/or vomit after receiving anesthesia.  If you develop these symptoms, drink clear liquids (apple juice, ginger ale, broth, 7-up, etc. ) until you feel better.  If your nausea and vomiting persists for 24 hours, please notify your surgeon.      All narcotic pain medications, along with inactivity and anesthesia, can cause constipation. Drinking plenty of liquids and increasing fiber intake will help.    For any questions of a medical nature, call your surgeon.    Do not make important decisions for 24 hours.    If you had general anesthesia, you may have a sore throat for a couple of days related to the breathing tube used during surgery.  You may use Cepacol lozenges to help with this discomfort.  If it  worsens or if you develop a fever, contact your surgeon.     If you feel your pain is not well managed with the pain medications prescribed by your surgeon, please contact your surgeon's office to let them know so they can address your concerns.      **If you have questions or concerns about your procedure,   call Dr. Azar at [phone]**

## 2023-03-16 NOTE — H&P
Gynecologic Oncology     Referring provider:    Lila Darling, APRN CNP  [address] [facility] [address]  [place] [address]   RE: Tiffanie Castrejon  : 1960  ZAK: 3/16/2023       CC: pelvic mass, family history of ovarian cancer    HPI: Ms Tiffanie Castrejon is a 62 year old year old female who presents for surgery.     She notes a history of a right sided ovarian/uterus mass which was found on imaging she had done given her family history of ovarian cancer. Most recent imaging includes an MRI 2021 and a TVUS 10/2022. TVUS showed a 1.3cm nodule, solid, with shadowing. This was stable from the MRI in December which suggested a uterine vs adnexal mass.     CA-125 on 10/14/22 was normal at 15.     Her sister had ovarian cancer (surgery and chemo and currently on maintenance therapy). Gris herself had negative panel genetic testing, which I reviewed today.     She feels well today, no changes to her health      Past Medical History:   Diagnosis Date     Hot flashes, menopausal      Insomnia     2/2 stress and menopause     Osteopenia      Pelvic mass      Uterine fibroid        Past Surgical History:   Procedure Laterality Date     COLONOSCOPY       EYE SURGERY Bilateral 2014    lasik     ORAL SURGERY IP CONSULT       SOFT TISSUE SURGERY Left 1992    lipoma on back        OBGYN history and Health Maintenance (Personally reviewed 10/26/2022):   (ages 26-36)  Last Pap Smear: HR HPVnegative 21  Last Mammogram:3/23/22 BiRads 1  Last Colonoscopy: 2016    Medications and allergies reviewed in EPIC    Social History:  Social History     Tobacco Use     Smoking status: Never     Smokeless tobacco: Never   Substance Use Topics     Alcohol use: Yes     Comment: 3/wk     Work: Retired from non profit  Ethnicity identification: white.   Preferred language: English  Lives at home with:     Family History:   The patient's family history is notable for:  Paternal first cousin with ovarian cancer  Sister  "with ovarian cancer, treated in [facility]  Father with prostate cancer  Mother with metastatic SCC    Physical Exam:     /73   Pulse 75   Temp 98.7  F (37.1  C) (Temporal)   Resp 18   Ht 1.575 m (5' 2\")   Wt 69.4 kg (153 lb)   SpO2 98%   BMI 27.98 kg/m    Body mass index is 27.98 kg/m .    General: Alert and oriented, no acute distress  Psych: Mood stable. Linear speech, appropriate affect  CV: RRR  Lungs: CTA  Abd: Soft, ND, non tender  : Deferred    Remainder of exam deferred    Labs/Imaging     10/14/22:    15    TVUS  10/14/22:    Narrative & Impression   EXAM: US TRANSVAGINAL PELVIC NON-OB  LOCATION: [facility]  DATE/TIME: 10/14/2022 12:08 PM     INDICATION: Pelvic mass, Family history of malignant neoplasm of ovary in first degree relative.  COMPARISON: 03/23/2022, 12/13/2021.  TECHNIQUE: Endovaginal ultrasound was performed to better visualize the adnexa.     FINDINGS:     UTERUS: 7 x 5 x 3 cm. Normal in size and position with no masses.     ENDOMETRIUM: 4 mm. Normal smooth endometrium.     RIGHT OVARY: 2.5 x 1.6 x 1.4 cm. 1.3 cm nodule in the right ovary is hypoechoic with posterior acoustical shadowing.     LEFT OVARY: 2.5 x 1.4 x 1.4 cm. Normal with flow demonstrated.     No significant free fluid.                                                                      IMPRESSION:  1.  1.3 cm right ovarian nodule with posterior acoustical shadowing. This is stable from prior examinations and likely represents a small fibroma.                    MRI 12/13/21:     IMPRESSION:  There is a 1.1 cm T2 hypointense nodule between the right ovary and  uterus. This nodule is poorly evaluated on T1 imaging due to small  size and remains indeterminate. This most likely represents a small  subserosal fibroid. Recommend follow-up pelvic MRI in 3 months for  further evaluation.     TIARRA GILBERT MD       Reviewed full panel testing from 11/2021: FUll panel testing was all " negative (see scanned results from 12/21/2021)     Assessment:    Tiffanie Castrejon is a 62 year old woman with a new diagnosis of an ovarian mass, family history of ovarian cancer.         Plan:     1.)   Pelvic mass and family history of ovarian cancer: Imaging and  are suggestive of a benign right ovarian mass, likely a fibroma. This could be originating from the uterus as well. Chance of malignancy is low, but I explained that the only way to know for sure is surgical excision. We discussed these findings in light of her family history (sister and paternal cousin with ovarian cancer). We reviewed that about 20% of advanced ovarian cancer is genetic. With negative panel testing it is unlikely she has a risk above baseline, however there are likely some clusters of cancer in families that we have not yet identified the potential genetic mechanism. I reviewed that prophylactic surgery is not recommended given her negative testing, however given she does have a finding on TVUS, it certainly would be reasonable to perform a BSO (I do not feel a hysterectomy is indicated) . We reviewed how there is no screening test for ovarian cancer.     -[facility]  BSO today    2.)Genetic risk factors were assessed: Has had panel testing      Dianne Azar MD    Department of Ob/Gyn and Women's Health  Division of Gynecologic Oncology  [facility]  Pager [phone]

## 2023-03-16 NOTE — H&P
GYN ONC Update H&P    H&P   Phong Lila Chaparro, ALTAGRACIA CNP  Date of Service:  2023  8:40 AM      Tiffanie is a 62 year old  female who presents for annual exam.      Besides routine health maintenance, she has no other health concerns today .     HPI:  The patient's PCP is  Physician No Ref-Primary.  Patient here today for her annual GYN exam, mammogram Pap smear and preop.  She has a laparoscopy BSO scheduled in 1 month with Gyn Onc for a nodule on her right ovary.     She is postmenopausal and has had no bleeding.     She has noticed more rectal bleeding with bowel movements.  She had a negative colonoscopy in 2016 with [facility].        GYNECOLOGIC HISTORY:     No LMP recorded. Patient is postmenopausal.  Her current contraception method is: menopause.  She  reports that she has never smoked. She has never used smokeless tobacco.  Patient is sexually active.  STD testing offered?  Declined  Last PHQ-9 score on record =   PHQ-9 SCORE 2023   PHQ-9 Total Score 0      Last GAD7 score on record =   CHESTER-7 SCORE 2023   Total Score 0      Alcohol Score = 2     HEALTH MAINTENANCE:  Cholesterol:      Recent Labs   Lab Test 11/10/21  0909   CHOL 221*   HDL 72   *   TRIG 65      Last Mammo: One year ago, Result: Normal, Next Mammo: Today   Pap:          Lab Results   Component Value Date     GYNINTERP   2021       Negative for Intraepithelial Lesion or Malignancy (NILM)     PAP NIL 2018     PAP NIL 2017     PAP NIL 04/15/2016   HPV-  Colonoscopy:  16, Result: Normal, Next Colonoscopy: 10 years.  Dexa:  10/21/20     FINDINGS:               Lumbar Spine (L1-L4)      T-score:  0.6               Left Femoral Neck            T-score:  -1.5               Right Femoral Neck          T-score:  -1.5               Lumbar (L1-L4) BMD: 1.271  Previous: 1.323                          Total Hip Mean BMD: 0.935  Previous: 0.987  Health maintenance updated:   "yes     HISTORY:                   OB History    Para Term  AB Living   3 0 0 0 0 3   SAB IAB Ectopic Multiple Live Births      0 0 0 0 3          # Outcome Date GA Lbr Ino/2nd Weight Sex Delivery Anes PTL Lv   3                      2                      1                                Patient Active Problem List   Diagnosis     Perimenopausal symptoms     Insomnia     Osteopenia           Past Surgical History:   Procedure Laterality Date     EYE SURGERY Bilateral      lasik     SOFT TISSUE SURGERY Left      lipoma on back       Social History            Tobacco Use     Smoking status: Never     Smokeless tobacco: Never   Substance Use Topics     Alcohol use: Yes       Comment: 3/wk       Problem (# of Occurrences) Relation (Name,Age of Onset)     Prostate Cancer (1) Father     Squamous cell carcinoma (1) Mother (88)     Ovarian Cancer (1) Sister                   Current Outpatient Medications   Medication Sig     calcium carbonate (OS-LALITHA) 500 MG tablet Take 1 tablet by mouth 2 times daily     Cholecalciferol (VITAMIN D3 PO) Take 1,000 Units by mouth daily      No current facility-administered medications for this visit.      No Known Allergies     Past medical, surgical, social and family histories were reviewed and updated in EPIC.     ROS:   12 point review of systems negative other than symptoms noted below or in the HPI.  Gastrointestinal: Blood in Stools  No urinary frequency or dysuria, bladder or kidney problems     EXAM:  /64   Ht 1.566 m (5' 1.65\")   Wt 69.6 kg (153 lb 6.4 oz)   BMI 28.37 kg/m     BMI: Body mass index is 28.37 kg/m .     PHYSICAL EXAM:  Constitutional:   Appearance: Well nourished, well developed, alert, in no acute distress  Neck:              Lymph Nodes:  No lymphadenopathy present                          Thyroid:  Gland size normal, nontender, no nodules or masses present     on palpation  Chest:             Respiratory Effort: "  Breathing unlabored  Cardiovascular:                          Heart: Auscultation:  Regular rate, normal rhythm, no murmurs present  Breasts:          Inspection of Breasts:  No lymphadenopathy present., Palpation of Breasts and Axillae:  No masses present on palpation, no breast tenderness., Axillary Lymph Nodes:  No lymphadenopathy present. and No nodularity, asymmetry or nipple discharge bilaterally.  Gastrointestinal:              Abdominal Examination:  Abdomen nontender to palpation, tone normal without rigidity or guarding, no masses present, umbilicus without lesions              Liver and Spleen:  No hepatomegaly present, liver nontender to palpation                          Hernias:  No hernias present  Lymphatic:     Lymph Nodes:  No other lymphadenopathy present  Skin:               General Inspection:  No rashes present, no lesions present, no areas of  discoloration  Neurologic:    Mental Status:  Oriented X3.  Normal strength and tone, sensory exam                grossly normal, mentation intact and speech normal.    Psychiatric:   Mentation appears normal and affect normal/bright.                                                                Pelvic Exam:  External Genitalia:                Normal appearance for age, no discharge present, no tenderness present, no inflammatory lesions present, color normal  Vagina:                Normal vaginal vault without central or paravaginal defects, moderate amount of thin creamy white discharge present, no inflammatory lesions present, no masses present  Bladder:                Nontender to palpation  Urethra:              Urethral Body:  Urethra palpation normal, urethra structural support normal              Urethral Meatus:  No erythema or lesions present  Cervix:                Appearance healthy, no lesions present, nontender to palpation, no bleeding present.  Friable with Pap collection  Uterus:                Uterus: firm, normal sized and nontender,  anteverted in position.   Adnexa:                No adnexal tenderness present, no adnexal masses present  Perineum:                Perineum within normal limits, no evidence of trauma, no rashes or skin lesions present  Anus:                Anus within normal limits, no hemorrhoids present  Inguinal Lymph Nodes:                No lymphadenopathy present  Pubic Hair:                Normal pubic hair distribution for age  Genitalia and Groin:                No rashes present, no lesions present, no areas of discoloration, no masses present  Rectal exam was negative.        COUNSELING:   Special attention given to:        Regular exercise       Healthy diet/nutrition       Colorectal Cancer Screening       (Colleen)menopause management     BMI: Body mass index is 28.37 kg/m .        ASSESSMENT:  62 year old female with satisfactory annual exam.      ICD-10-CM     1. Encounter for gynecological examination without abnormal finding  Z01.419         2. Screening for HIV (human immunodeficiency virus)  Z11.4 HIV Antigen Antibody Combo       HIV Antigen Antibody Combo       3. Screen for colon cancer  Z12.11         4. Screening for cardiovascular condition  Z13.6 Lipid Profile       Lipid Profile       5. Encounter for vitamin deficiency screening  Z13.21 Vitamin D Deficiency       Vitamin D Deficiency       6. Screening for diabetes mellitus  Z13.1 Glucose whole blood       Glucose whole blood       7. Screening for cervical cancer  Z12.4 Pap thin layer screen with HPV - recommended age 30 - 65 years       8. Need for hepatitis C screening test  Z11.59 Hepatitis C antibody       Hepatitis C antibody       9. Vaginal discharge  N89.8 Wet prep - Clinic Collect       10. Screening for disorder of blood and blood-forming organs  Z13.0 CBC with platelets       CBC with platelets       11. Blood in stool  K92.1 Fecal colorectal cancer screen (FIT)       Fecal colorectal cancer screen (FIT)       12. Preop examination  Z01.818    "      13. Pelvic mass  R19.00               PLAN:  62-year-old postmenopausal female with possible vaginitis.  Wet prep: negative.   Pap smear was collected and if it is normal she can repeat in 1 year.  With her rectal exam being negative I did encourage her to call [facility] for further evaluation of blood in her stools.  We also send her home with a fit test to complete.       She is cleared for general anesthesia.  The consent is per the performing physician.     ALTAGRACIA Winslow CNP    ---------------------------------------    Addendum:   Patient reports no changes in her medical history since her H&P with her PCP that is copied above. Updated exam from today:    Exam:  /73   Pulse 75   Temp 98.7  F (37.1  C) (Temporal)   Resp 18   Ht 1.575 m (5' 2\")   Wt 69.4 kg (153 lb)   SpO2 98%   BMI 27.98 kg/m     Gen: alert, NAD  CV: RRR, no murmurs  Resp: breathing comfortably on room air, CTAB on auscultation  Abd: soft, nontender, nondistended  Ext: warm, well perfused, no edema    Plan to proceed to OR for scheduled procedure.     Aris Flores MD  OB/GYN PGY-3  03/16/2023 7:01 AM        "

## 2023-03-16 NOTE — ANESTHESIA CARE TRANSFER NOTE
Patient: Tiffanie Castrejon    Procedure: Procedure(s):  LAPAROSCOPIC BILATERAL SALPINGO-OOPHORECTOMY       Diagnosis: Pelvic mass [R19.00]  Diagnosis Additional Information: No value filed.    Anesthesia Type:   General     Note:    Oropharynx: oropharynx clear of all foreign objects and spontaneously breathing  Level of Consciousness: drowsy  Oxygen Supplementation: room air    Independent Airway: airway patency satisfactory and stable  Dentition: dentition unchanged  Vital Signs Stable: post-procedure vital signs reviewed and stable  Report to RN Given: handoff report given  Patient transferred to: PACU  Comments: Neuromuscular blockade reversed after TOF 4/4, spontaneous respirations, adequate tidal volumes, followed commands to voice, oropharynx suctioned with soft flexible catheter, extubated atraumatically, extubated with suction, airway patent after extubation.  Oxygen via room air at room air to PACU. SpO2, NiBP, and EKG monitors and alarms on and functioning, Ashley Hugger warmer connected to patient gown, report on patient's clinical status given to PACU RN, RN questions answered.     Handoff Report: Identifed the Patient, Identified the Reponsible Provider, Reviewed the pertinent medical history, Discussed the surgical course, Reviewed Intra-OP anesthesia mangement and issues during anesthesia, Set expectations for post-procedure period and Allowed opportunity for questions and acknowledgement of understanding      Vitals:  Vitals Value Taken Time   BP     Temp     Pulse     Resp 22 03/16/23 0914   SpO2     Vitals shown include unvalidated device data.    Electronically Signed By: ALTAGRACIA Kruse CRNA  March 16, 2023  9:15 AM

## 2023-03-17 LAB
PATH REPORT.COMMENTS IMP SPEC: NORMAL
PATH REPORT.COMMENTS IMP SPEC: NORMAL
PATH REPORT.FINAL DX SPEC: NORMAL
PATH REPORT.GROSS SPEC: NORMAL
PATH REPORT.INTRAOP OBS SPEC DOC: NORMAL
PATH REPORT.MICROSCOPIC SPEC OTHER STN: NORMAL
PATH REPORT.RELEVANT HX SPEC: NORMAL
PHOTO IMAGE: NORMAL

## 2023-03-18 LAB
PATH REPORT.COMMENTS IMP SPEC: NORMAL
PATH REPORT.FINAL DX SPEC: NORMAL
PATH REPORT.GROSS SPEC: NORMAL
PATH REPORT.MICROSCOPIC SPEC OTHER STN: NORMAL

## 2023-03-22 ENCOUNTER — TELEPHONE (OUTPATIENT)
Dept: ONCOLOGY | Facility: CLINIC | Age: 63
End: 2023-03-22
Payer: COMMERCIAL

## 2023-03-22 NOTE — TELEPHONE ENCOUNTER
Called to review pathology  Final reports benign from BSO (fibroma)  She is doing well  followup as scheduled       Dianne Azar MD  Gynecologic Oncology  Pager [phone]

## 2023-04-04 NOTE — PROGRESS NOTES
Gynecologic Oncology Postoperative Progress Note     S: Pt feels well. No Pain No SOB, CP.  No nausea.     O:  /67 (BP Location: Left arm, Patient Position: Sitting, Cuff Size: Adult Large)   Pulse 72   Temp 97.8  F (36.6  C) (Oral)   Resp 20   Wt 70.4 kg (155 lb 3.2 oz)   SpO2 100%   BMI 28.39 kg/m      Gen: comfortable, no acute distress    Abd: soft, non tender.  Incisions all healing well   Ext: warm and well perfused, no edema  Hemoglobin   Date Value Ref Range Status   02/16/2023 12.9 11.7 - 15.7 g/dL Final   11/10/2021 13.8 11.7 - 15.7 g/dL Final   04/08/2015 13.1 11.7 - 15.7 g/dL Final     Pathology:  A.  Right fallopian tube and ovary, laparoscopic salpingo-oophorectomy:  - A benign atrophic ovary with a 1.2 cm benign ovarian fibroma.  - Benign fimbriated fallopian tube without diagnostic abnormalities.     B.  Left fallopian tube and ovary, laparoscopic salpingo-oophorectomy:  - A benign atrophic ovary.  - Benign fimbriated fallopian tube without diagnostic abnormalities.    A/P: Tiffanie Castrejon is a 62 year old female 3 weeks post-op s/p laparoscopic BSO for pelvic mass and family history of ovarian cancer. Reviewed again benign pathology.      - Followup GYN ONC PRN       Dianne Azar MD  Gynecologic Oncology  Pager [phone]

## 2023-04-05 ENCOUNTER — ONCOLOGY VISIT (OUTPATIENT)
Dept: ONCOLOGY | Facility: CLINIC | Age: 63
End: 2023-04-05
Attending: OBSTETRICS & GYNECOLOGY
Payer: COMMERCIAL

## 2023-04-05 VITALS
TEMPERATURE: 97.8 F | OXYGEN SATURATION: 100 % | SYSTOLIC BLOOD PRESSURE: 107 MMHG | DIASTOLIC BLOOD PRESSURE: 67 MMHG | WEIGHT: 155.2 LBS | RESPIRATION RATE: 20 BRPM | BODY MASS INDEX: 28.39 KG/M2 | HEART RATE: 72 BPM

## 2023-04-05 DIAGNOSIS — R19.00 PELVIC MASS: Primary | ICD-10-CM

## 2023-04-05 PROCEDURE — G0463 HOSPITAL OUTPT CLINIC VISIT: HCPCS | Performed by: OBSTETRICS & GYNECOLOGY

## 2023-04-05 PROCEDURE — 99024 POSTOP FOLLOW-UP VISIT: CPT | Performed by: OBSTETRICS & GYNECOLOGY

## 2023-04-05 ASSESSMENT — PAIN SCALES - GENERAL: PAINLEVEL: MILD PAIN (2)

## 2023-04-05 NOTE — LETTER
4/5/2023         RE: Tiffanie Castrejon  [address] [facility] [id number]        Dear Colleague,    Thank you for referring your patient, Tiffanie Castrejon, to the [facility]. Please see a copy of my visit note below.    Gynecologic Oncology Postoperative Progress Note     S: Pt feels well. No Pain No SOB, CP.  No nausea.     O:  /67 (BP Location: Left arm, Patient Position: Sitting, Cuff Size: Adult Large)   Pulse 72   Temp 97.8  F (36.6  C) (Oral)   Resp 20   Wt 70.4 kg (155 lb 3.2 oz)   SpO2 100%   BMI 28.39 kg/m      Gen: comfortable, no acute distress    Abd: soft, non tender.  Incisions all healing well   Ext: warm and well perfused, no edema  Hemoglobin   Date Value Ref Range Status   02/16/2023 12.9 11.7 - 15.7 g/dL Final   11/10/2021 13.8 11.7 - 15.7 g/dL Final   04/08/2015 13.1 11.7 - 15.7 g/dL Final     Pathology:  A.  Right fallopian tube and ovary, laparoscopic salpingo-oophorectomy:  - A benign atrophic ovary with a 1.2 cm benign ovarian fibroma.  - Benign fimbriated fallopian tube without diagnostic abnormalities.     B.  Left fallopian tube and ovary, laparoscopic salpingo-oophorectomy:  - A benign atrophic ovary.  - Benign fimbriated fallopian tube without diagnostic abnormalities.    A/P: Tiffanie Castrejon is a 62 year old female 3 weeks post-op s/p laparoscopic BSO for pelvic mass and family history of ovarian cancer. Reviewed again benign pathology.      - Followup GYN ONC PRN       Dianne Azar MD  Gynecologic Oncology  Pager [phone]           Again, thank you for allowing me to participate in the care of your patient.        Sincerely,        Dianne Azar MD

## 2023-04-07 ENCOUNTER — TRANSFERRED RECORDS (OUTPATIENT)
Dept: HEALTH INFORMATION MANAGEMENT | Facility: CLINIC | Age: 63
End: 2023-04-07
Payer: COMMERCIAL

## 2024-02-28 ENCOUNTER — OFFICE VISIT (OUTPATIENT)
Dept: OBGYN | Facility: CLINIC | Age: 64
End: 2024-02-28
Payer: COMMERCIAL

## 2024-02-28 ENCOUNTER — ANCILLARY PROCEDURE (OUTPATIENT)
Dept: MAMMOGRAPHY | Facility: CLINIC | Age: 64
End: 2024-02-28
Payer: COMMERCIAL

## 2024-02-28 VITALS
HEIGHT: 62 IN | SYSTOLIC BLOOD PRESSURE: 110 MMHG | WEIGHT: 151.6 LBS | BODY MASS INDEX: 27.9 KG/M2 | DIASTOLIC BLOOD PRESSURE: 68 MMHG

## 2024-02-28 DIAGNOSIS — Z13.29 SCREENING FOR THYROID DISORDER: ICD-10-CM

## 2024-02-28 DIAGNOSIS — Z13.220 ENCOUNTER FOR LIPID SCREENING FOR CARDIOVASCULAR DISEASE: ICD-10-CM

## 2024-02-28 DIAGNOSIS — Z13.0 SCREENING FOR DISORDER OF BLOOD AND BLOOD-FORMING ORGANS: ICD-10-CM

## 2024-02-28 DIAGNOSIS — Z01.419 ENCOUNTER FOR GYNECOLOGICAL EXAMINATION (GENERAL) (ROUTINE) WITHOUT ABNORMAL FINDINGS: Primary | ICD-10-CM

## 2024-02-28 DIAGNOSIS — Z13.228 SCREENING FOR METABOLIC DISORDER: ICD-10-CM

## 2024-02-28 DIAGNOSIS — R73.03 PRE-DIABETES: ICD-10-CM

## 2024-02-28 DIAGNOSIS — Z12.4 ENCOUNTER FOR SCREENING FOR CERVICAL CANCER: ICD-10-CM

## 2024-02-28 DIAGNOSIS — Z13.820 SPECIAL SCREENING FOR OSTEOPOROSIS: ICD-10-CM

## 2024-02-28 DIAGNOSIS — Z13.6 ENCOUNTER FOR LIPID SCREENING FOR CARDIOVASCULAR DISEASE: ICD-10-CM

## 2024-02-28 DIAGNOSIS — Z12.31 VISIT FOR SCREENING MAMMOGRAM: ICD-10-CM

## 2024-02-28 PROCEDURE — 77067 SCR MAMMO BI INCL CAD: CPT | Mod: TC | Performed by: RADIOLOGY

## 2024-02-28 PROCEDURE — 77063 BREAST TOMOSYNTHESIS BI: CPT | Mod: TC | Performed by: RADIOLOGY

## 2024-02-28 PROCEDURE — G0145 SCR C/V CYTO,THINLAYER,RESCR: HCPCS | Performed by: NURSE PRACTITIONER

## 2024-02-28 PROCEDURE — 87624 HPV HI-RISK TYP POOLED RSLT: CPT | Performed by: NURSE PRACTITIONER

## 2024-02-28 PROCEDURE — 99396 PREV VISIT EST AGE 40-64: CPT | Performed by: NURSE PRACTITIONER

## 2024-02-28 ASSESSMENT — PATIENT HEALTH QUESTIONNAIRE - PHQ9
SUM OF ALL RESPONSES TO PHQ QUESTIONS 1-9: 0
5. POOR APPETITE OR OVEREATING: NOT AT ALL

## 2024-02-28 ASSESSMENT — ANXIETY QUESTIONNAIRES

## 2024-02-28 NOTE — PROGRESS NOTES
Tiffanie is a 63 year old  female who presents for annual exam.     Besides routine health maintenance, she has no other health concerns today .    HPI:  The patient's PCP is  Physician No Ref-Primary.      Patient here today for her annual Wellwoman exam.  She is a feeling well and has no GYN concerns.  She is due for Pap smear today.  She has her mammogram today.  She is questionable establishing care with primary.    Colonoscopy is up-to-date.  She is due for DEXA scan next year.  She had a bilateral 's opinion oophorectomy with gynecology oncology and has recovered very well.  She had a benign fibroma on her right ovary.  This is more risk reducing as she does have a family history of ovarian cancer in her sister.      GYNECOLOGIC HISTORY:    No LMP recorded. Patient is postmenopausal.      Her current contraception method is: menopause.  She  reports that she has never smoked. She has never used smokeless tobacco.    Patient is sexually active.  STD testing offered?  Declined  Last PHQ-9 score on record =       2024     8:42 AM   PHQ-9 SCORE   PHQ-9 Total Score 0     Last GAD7 score on record =       2024     8:42 AM   CHESTER-7 SCORE   Total Score 0     Alcohol Score = 2    HEALTH MAINTENANCE:  Cholesterol: (  Cholesterol   Date Value Ref Range Status   2023 191 <200 mg/dL Final   11/10/2021 221 (H) <200 mg/dL Final   01/15/2014 171 115 - 199 mg/dL Final      Pap:   Lab Results   Component Value Date    GYNINTERP Neg hpv 2023     Negative for Intraepithelial Lesion or Malignancy (NILM)    GYNINTERP Neg hpv 2021     Negative for Intraepithelial Lesion or Malignancy (NILM)    PAP NIL-neg hpv 2018    PAP NIL-neg hpv 2017    PAP NIL-neg hpv 04/15/2016     Health maintenance updated:  yes    Care Gaps    Overdue          Never done ADVANCE CARE PLANNING (Every 5 Years)     Never done RSV VACCINE (Pregnancy & 60+) (1 - 1-dose 60+ series)     SEP 1  2023 INFLUENZA VACCINE  (1)  Last completed: Oct 15, 2022     SEP 1  2023 COVID-19 Vaccine ( season)  Last completed: Dec 10, 2022     FEB 16  2024 YEARLY PREVENTIVE VISIT (Yearly)  Last completed:  HPV TEST (Once)  Last completed:  PAP (Yearly)  Last completed: 2023         Upcoming          2025 MAMMO SCREENING (Every 2 Years)   Scheduled for:  COLORECTAL CANCER SCREENING (COLONOSCOPY) (Every 10 Years)  Last completed:  GLUCOSE (Every 3 Years)  Last completed:  LIPID (Every 5 Years)  Last completed:  DTAP/TDAP/TD IMMUNIZATION (3 - Td or Tdap)  Last completed: 2021       HISTORY:  OB History    Para Term  AB Living   3 0 0 0 0 3   SAB IAB Ectopic Multiple Live Births   0 0 0 0 3      # Outcome Date GA Lbr Ino/2nd Weight Sex Delivery Anes PTL Lv   3             2             1                 Patient Active Problem List   Diagnosis    Perimenopausal symptoms    Insomnia    Osteopenia     Past Surgical History:   Procedure Laterality Date    COLONOSCOPY      EYE SURGERY Bilateral 2014    lasik    LAPAROSCOPIC SALPINGO-OOPHORECTOMY Bilateral 3/16/2023    Procedure: LAPAROSCOPIC BILATERAL SALPINGO-OOPHORECTOMY;  Surgeon: Dianne Azar MD;  Location: [facility] OR    ORAL SURGERY IP CONSULT      SOFT TISSUE SURGERY Left 1992    lipoma on back      Social History     Tobacco Use    Smoking status: Never    Smokeless tobacco: Never   Substance Use Topics    Alcohol use: Yes     Comment: 3/wk      Problem (# of Occurrences) Relation (Name,Age of Onset)    Prostate Cancer (1) Father    Squamous cell carcinoma (1) Mother (88)    Ovarian Cancer (1) Sister              Current Outpatient Medications   Medication Sig    calcium carbonate (OS-LALITHA) 500 MG tablet Take 1 tablet by mouth 2 times daily     "Cholecalciferol (VITAMIN D3 PO) Take 1,000 Units by mouth daily     No current facility-administered medications for this visit.     No Known Allergies    Past medical, surgical, social and family histories were reviewed and updated in EPIC.    ROS:   12 point review of systems negative other than symptoms noted below or in the HPI.  No urinary frequency or dysuria, bladder or kidney problems    EXAM:  /68   Ht 1.562 m (5' 1.5\")   Wt 68.8 kg (151 lb 9.6 oz)   BMI 28.18 kg/m     BMI: Body mass index is 28.18 kg/m .    PHYSICAL EXAM:  Constitutional:   Appearance: Well nourished, well developed, alert, in no acute distress  Neck:  Lymph Nodes:  No lymphadenopathy present    Thyroid:  Gland size normal, nontender, no nodules or masses present  on palpation  Chest:  Respiratory Effort:  Breathing unlabored  Cardiovascular:    Heart: Auscultation:  Regular rate, normal rhythm, no murmurs present  Breasts: Inspection of Breasts:  No lymphadenopathy present., Palpation of Breasts and Axillae:  No masses present on palpation, no breast tenderness., Axillary Lymph Nodes:  No lymphadenopathy present., and No nodularity, asymmetry or nipple discharge bilaterally.  Skin:  General Inspection:  No rashes present, no lesions present, no areas of  discoloration  Neurologic:    Mental Status:  Oriented X3.  Normal strength and tone, sensory exam                grossly normal, mentation intact and speech normal.    Psychiatric:   Mentation appears normal and affect normal/bright.         Pelvic Exam:  External Genitalia:     Normal appearance for age, no discharge present, no tenderness present, no inflammatory lesions present, color normal  Vagina:     Normal vaginal vault without central or paravaginal defects, no discharge present, no inflammatory lesions present, no masses present  Bladder:     Nontender to palpation  Urethra:   Urethral Body:  Urethra palpation normal, urethra structural support normal   Urethral " Meatus:  No erythema or lesions present  Cervix:     Appearance healthy, no lesions present, nontender to palpation, no bleeding present  Uterus:     Uterus: firm, normal sized and nontender, anteverted in position.   Adnexa:     Surgically absent  Perineum:     Perineum within normal limits, no evidence of trauma, no rashes or skin lesions present  Anus:     Anus within normal limits, no hemorrhoids present  Inguinal Lymph Nodes:     No lymphadenopathy present  Pubic Hair:     Normal pubic hair distribution for age  Genitalia and Groin:     No rashes present, no lesions present, no areas of discoloration, no masses present    COUNSELING:   Special attention given to:        Regular exercise       Healthy diet/nutrition       Osteoporosis prevention/bone health       (Colleen)menopause management    BMI: Body mass index is 28.18 kg/m .      ASSESSMENT:  63 year old female with satisfactory annual exam.    ICD-10-CM    1. Encounter for screening for cervical cancer  Z12.4 Pap thin layer screen with HPV - recommended age 30 - 65 years      2. Encounter for gynecological examination (general) (routine) without abnormal findings  Z01.419       3. Screening for thyroid disorder  Z13.29 TSH with free T4 reflex      4. Screening for metabolic disorder  Z13.228 Comprehensive metabolic panel     Hemoglobin A1c      5. Encounter for lipid screening for cardiovascular disease  Z13.220 Lipid Profile    Z13.6       6. Special screening for osteoporosis  Z13.820 DX Hip/Pelvis/Spine      7. Screening for disorder of blood and blood-forming organs  Z13.0 CBC with Platelets & Differential          PLAN:  63-year-old postmenopausal female with a normal postmenopausal GYN exam.  Pap smear was collected and if it is normal she can repeat in 3 years.  She is to continue with annual mammograms.  Primary care providers were given to her with a written referral.  Fasting labs should be completed this year and I like her to complete a bone  scan again next year with her annual exam.    ALTAGRACIA Winslow CNP

## 2024-03-11 ENCOUNTER — ANCILLARY PROCEDURE (OUTPATIENT)
Dept: BONE DENSITY | Facility: CLINIC | Age: 64
End: 2024-03-11
Attending: NURSE PRACTITIONER
Payer: COMMERCIAL

## 2024-03-11 ENCOUNTER — LAB (OUTPATIENT)
Dept: LAB | Facility: CLINIC | Age: 64
End: 2024-03-11
Payer: COMMERCIAL

## 2024-03-11 DIAGNOSIS — Z13.6 ENCOUNTER FOR LIPID SCREENING FOR CARDIOVASCULAR DISEASE: ICD-10-CM

## 2024-03-11 DIAGNOSIS — Z13.820 SPECIAL SCREENING FOR OSTEOPOROSIS: ICD-10-CM

## 2024-03-11 DIAGNOSIS — Z13.220 ENCOUNTER FOR LIPID SCREENING FOR CARDIOVASCULAR DISEASE: ICD-10-CM

## 2024-03-11 DIAGNOSIS — Z13.29 SCREENING FOR THYROID DISORDER: ICD-10-CM

## 2024-03-11 DIAGNOSIS — Z13.0 SCREENING FOR DISORDER OF BLOOD AND BLOOD-FORMING ORGANS: ICD-10-CM

## 2024-03-11 DIAGNOSIS — Z13.228 SCREENING FOR METABOLIC DISORDER: ICD-10-CM

## 2024-03-11 LAB
ALBUMIN SERPL BCG-MCNC: 4.2 G/DL (ref 3.5–5.2)
ALP SERPL-CCNC: 85 U/L (ref 40–150)
ALT SERPL W P-5'-P-CCNC: 35 U/L (ref 0–50)
ANION GAP SERPL CALCULATED.3IONS-SCNC: 9 MMOL/L (ref 7–15)
AST SERPL W P-5'-P-CCNC: 23 U/L (ref 0–45)
BASOPHILS # BLD AUTO: 0 10E3/UL (ref 0–0.2)
BASOPHILS NFR BLD AUTO: 0 %
BILIRUB SERPL-MCNC: 0.7 MG/DL
BUN SERPL-MCNC: 16.2 MG/DL (ref 8–23)
CALCIUM SERPL-MCNC: 10 MG/DL (ref 8.8–10.2)
CHLORIDE SERPL-SCNC: 107 MMOL/L (ref 98–107)
CHOLEST SERPL-MCNC: 170 MG/DL
CREAT SERPL-MCNC: 0.82 MG/DL (ref 0.51–0.95)
DEPRECATED HCO3 PLAS-SCNC: 25 MMOL/L (ref 22–29)
EGFRCR SERPLBLD CKD-EPI 2021: 80 ML/MIN/1.73M2
EOSINOPHIL # BLD AUTO: 0.1 10E3/UL (ref 0–0.7)
EOSINOPHIL NFR BLD AUTO: 1 %
ERYTHROCYTE [DISTWIDTH] IN BLOOD BY AUTOMATED COUNT: 12.4 % (ref 10–15)
FASTING STATUS PATIENT QL REPORTED: YES
GLUCOSE SERPL-MCNC: 98 MG/DL (ref 70–99)
HBA1C MFR BLD: 5.8 %
HCT VFR BLD AUTO: 40 % (ref 35–47)
HDLC SERPL-MCNC: 49 MG/DL
HGB BLD-MCNC: 13.2 G/DL (ref 11.7–15.7)
IMM GRANULOCYTES # BLD: 0 10E3/UL
IMM GRANULOCYTES NFR BLD: 0 %
LDLC SERPL CALC-MCNC: 110 MG/DL
LYMPHOCYTES # BLD AUTO: 3 10E3/UL (ref 0.8–5.3)
LYMPHOCYTES NFR BLD AUTO: 41 %
MCH RBC QN AUTO: 29 PG (ref 26.5–33)
MCHC RBC AUTO-ENTMCNC: 33 G/DL (ref 31.5–36.5)
MCV RBC AUTO: 88 FL (ref 78–100)
MONOCYTES # BLD AUTO: 0.6 10E3/UL (ref 0–1.3)
MONOCYTES NFR BLD AUTO: 8 %
NEUTROPHILS # BLD AUTO: 3.7 10E3/UL (ref 1.6–8.3)
NEUTROPHILS NFR BLD AUTO: 50 %
NONHDLC SERPL-MCNC: 121 MG/DL
NRBC # BLD AUTO: 0 10E3/UL
NRBC BLD AUTO-RTO: 0 /100
PLATELET # BLD AUTO: 301 10E3/UL (ref 150–450)
POTASSIUM SERPL-SCNC: 4.3 MMOL/L (ref 3.4–5.3)
PROT SERPL-MCNC: 6.9 G/DL (ref 6.4–8.3)
RBC # BLD AUTO: 4.55 10E6/UL (ref 3.8–5.2)
SODIUM SERPL-SCNC: 141 MMOL/L (ref 135–145)
TRIGL SERPL-MCNC: 55 MG/DL
TSH SERPL DL<=0.005 MIU/L-ACNC: 0.72 UIU/ML (ref 0.3–4.2)
WBC # BLD AUTO: 7.4 10E3/UL (ref 4–11)

## 2024-03-11 PROCEDURE — 85025 COMPLETE CBC W/AUTO DIFF WBC: CPT

## 2024-03-11 PROCEDURE — 82040 ASSAY OF SERUM ALBUMIN: CPT

## 2024-03-11 PROCEDURE — 77080 DXA BONE DENSITY AXIAL: CPT | Mod: TC | Performed by: PHYSICIAN ASSISTANT

## 2024-03-11 PROCEDURE — 36415 COLL VENOUS BLD VENIPUNCTURE: CPT

## 2024-03-11 PROCEDURE — 83036 HEMOGLOBIN GLYCOSYLATED A1C: CPT

## 2024-03-11 PROCEDURE — 80061 LIPID PANEL: CPT

## 2024-03-11 PROCEDURE — 84443 ASSAY THYROID STIM HORMONE: CPT

## 2025-04-05 ENCOUNTER — HEALTH MAINTENANCE LETTER (OUTPATIENT)
Age: 65
End: 2025-04-05

## 2025-04-18 NOTE — PROGRESS NOTES
Tiffanie is a 64 year old  female who presents for annual exam.     Besides routine health maintenance,  she would like to discuss discuss MMR and booster.    HPI:  The patient's PCP Marj Mccall MD starting with her in MAY.    Patient here today for her annual well woman exam.  Pap smear is up-to-date.  She has her mammogram today.  Bone scan will be due next year.  She had a negative fit test last May.    She had a bilateral salpingo-oophorectomy with gynecology oncology due to a benign fibroma on her right ovary back in .  She has no vaginal bleeding.    She has questions about the MMR booster.      She has a daughter who lives in [place] and in [place].  She will be traveling to [place] next week to visit her.      GYNECOLOGIC HISTORY:    No LMP recorded. Patient is postmenopausal.    Her current contraception method is: menopause.  She  reports that she has never smoked. She has never used smokeless tobacco.    Patient is sexually active.  STD testing offered?  Declined  Last PHQ-9 score on record =       2024     8:42 AM   PHQ-9 SCORE   PHQ-9 Total Score 0     Last GAD7 score on record =       2024     8:42 AM   CHESTER-7 SCORE   Total Score 0     Alcohol Score =     HEALTH MAINTENANCE:  Cholesterol:   Recent Labs   Lab Test 24  1028 23  0925   CHOL 170 191   HDL 49* 62   * 116*   TRIG 55 63     Last Mammo: One year ago, Result: Normal, Next Mammo: Today     Pap:   Lab Results   Component Value Date    GYNINTERP  2024     Negative for Intraepithelial Lesion or Malignancy (NILM)    GYNINTERP  2023     Negative for Intraepithelial Lesion or Malignancy (NILM)    GYNINTERP  2021     Negative for Intraepithelial Lesion or Malignancy (NILM)    PAP NIL 2018    PAP NIL 2017    PAP NIL 04/15/2016     Colonoscopy:  23 FIT, Result: Normal, Next Colonoscopy: 1 years.  Dexa:  3/11/24    Health maintenance updated:  yes    HISTORY:  OB History    Para Term  " AB Living   3 0 0 0 0 3   SAB IAB Ectopic Multiple Live Births   0 0 0 0 3      # Outcome Date GA Lbr Ino/2nd Weight Sex Type Anes PTL Lv   3             2             1                 Patient Active Problem List   Diagnosis    Perimenopausal symptoms    Insomnia    Osteopenia     Past Surgical History:   Procedure Laterality Date    COLONOSCOPY      EYE SURGERY Bilateral 2014    lasik    LAPAROSCOPIC SALPINGO-OOPHORECTOMY Bilateral 3/16/2023    Procedure: LAPAROSCOPIC BILATERAL SALPINGO-OOPHORECTOMY;  Surgeon: Dianne Azar MD;  Location: [facility]    ORAL SURGERY IP CONSULT      SOFT TISSUE SURGERY Left 1992    lipoma on back      Social History     Tobacco Use    Smoking status: Never    Smokeless tobacco: Never   Substance Use Topics    Alcohol use: Yes     Comment: 3/wk      Problem (# of Occurrences) Relation (Name,Age of Onset)    Prostate Cancer (1) Father    Squamous cell carcinoma (1) Mother (88)    Ovarian Cancer (1) Sister              Current Outpatient Medications   Medication Sig Dispense Refill    calcium carbonate (OS-LALITHA) 500 MG tablet Take 1 tablet by mouth 2 times daily      Cholecalciferol (VITAMIN D3 PO) Take 1,000 Units by mouth daily       No current facility-administered medications for this visit.     No Known Allergies    Past medical, surgical, social and family histories were reviewed and updated in [facility].    EXAM:  /70   Ht 1.568 m (5' 1.75\")   Wt 70.4 kg (155 lb 3.2 oz)   Breastfeeding No   BMI 28.62 kg/m     BMI: Body mass index is 28.62 kg/m .    PHYSICAL EXAM:  Constitutional:   Appearance: Well nourished, well developed, alert, in no acute distress  Neck:  Lymph Nodes:  No lymphadenopathy present    Thyroid:  Gland size normal, nontender, no nodules or masses present  on palpation  Chest:  Respiratory Effort:  Breathing unlabored  Cardiovascular:    Heart: Auscultation:  Regular rate, normal rhythm, no murmurs " present  Breasts: Inspection of Breasts:  No lymphadenopathy present., Palpation of Breasts and Axillae:  No masses present on palpation, no breast tenderness., Axillary Lymph Nodes:  No lymphadenopathy present., and No nodularity, asymmetry or nipple discharge bilaterally.  Gastrointestinal:   Abdominal Examination:  Abdomen nontender to palpation, tone normal without rigidity or guarding, no masses present, umbilicus without lesions   Liver and Spleen:  No hepatomegaly present, liver nontender to palpation    Hernias:  No hernias present  Lymphatic: Lymph Nodes:  No other lymphadenopathy present  Skin:  General Inspection:  No rashes present, no lesions present, no areas of  discoloration  Neurologic:    Mental Status:  Oriented X3.  Normal strength and tone, sensory exam                grossly normal, mentation intact and speech normal.    Psychiatric:   Mentation appears normal and affect normal/bright.         Cervix: no lesions, no discharge  Ovaries: surgically absent  Uterus: anteverted, smooth contour, without enlargement, mobile and without tenderness  Rectal:  deferred    COUNSELING:   Special attention given to:        Regular exercise       Healthy diet/nutrition       Osteoporosis prevention/bone health       Colorectal Cancer Screening       (Colleen)menopause management    BMI: Body mass index is 28.62 kg/m .      ASSESSMENT:  64 year old female with satisfactory annual exam.    ICD-10-CM    1. Encounter for gynecological examination without abnormal finding  Z01.419       2. Immunity status testing  Z01.84 CANCELED: Rubella antibody IgM     CANCELED: MUMPS AB IGM, IFA     CANCELED: Rubeola Antibody IgG      3. Screening for colon cancer  Z12.11 COLOGUARD([employer])          PLAN:  64-year-old postmenopausal female with a normal postmenopausal GYN exam.  We will order her Cologuard to complete this year.  We have asked her to complete a bone scan next year and continue with annual mammograms.  We  do not carry the MMR vaccine-she will discuss with PCP in May    ALTAGRACIA Winslow CNP

## 2025-04-22 ENCOUNTER — ANCILLARY PROCEDURE (OUTPATIENT)
Dept: MAMMOGRAPHY | Facility: CLINIC | Age: 65
End: 2025-04-22
Payer: COMMERCIAL

## 2025-04-22 ENCOUNTER — OFFICE VISIT (OUTPATIENT)
Dept: OBGYN | Facility: CLINIC | Age: 65
End: 2025-04-22
Payer: COMMERCIAL

## 2025-04-22 VITALS
WEIGHT: 155.2 LBS | BODY MASS INDEX: 28.56 KG/M2 | SYSTOLIC BLOOD PRESSURE: 112 MMHG | DIASTOLIC BLOOD PRESSURE: 70 MMHG | HEIGHT: 62 IN

## 2025-04-22 DIAGNOSIS — Z01.84 IMMUNITY STATUS TESTING: ICD-10-CM

## 2025-04-22 DIAGNOSIS — Z01.419 ENCOUNTER FOR GYNECOLOGICAL EXAMINATION WITHOUT ABNORMAL FINDING: Primary | ICD-10-CM

## 2025-04-22 DIAGNOSIS — Z12.31 VISIT FOR SCREENING MAMMOGRAM: ICD-10-CM

## 2025-04-22 DIAGNOSIS — Z12.11 SCREENING FOR COLON CANCER: ICD-10-CM

## 2025-04-22 PROCEDURE — 77067 SCR MAMMO BI INCL CAD: CPT | Mod: TC | Performed by: RADIOLOGY

## 2025-04-22 PROCEDURE — 3078F DIAST BP <80 MM HG: CPT | Performed by: NURSE PRACTITIONER

## 2025-04-22 PROCEDURE — 99396 PREV VISIT EST AGE 40-64: CPT | Performed by: NURSE PRACTITIONER

## 2025-04-22 PROCEDURE — 77063 BREAST TOMOSYNTHESIS BI: CPT | Mod: TC | Performed by: RADIOLOGY

## 2025-04-22 PROCEDURE — 99459 PELVIC EXAMINATION: CPT | Performed by: NURSE PRACTITIONER

## 2025-04-22 PROCEDURE — 3074F SYST BP LT 130 MM HG: CPT | Performed by: NURSE PRACTITIONER

## 2025-05-07 ENCOUNTER — OFFICE VISIT (OUTPATIENT)
Dept: FAMILY MEDICINE | Facility: CLINIC | Age: 65
End: 2025-05-07

## 2025-05-07 VITALS
HEIGHT: 61 IN | TEMPERATURE: 98.2 F | DIASTOLIC BLOOD PRESSURE: 75 MMHG | SYSTOLIC BLOOD PRESSURE: 113 MMHG | OXYGEN SATURATION: 98 % | WEIGHT: 154 LBS | BODY MASS INDEX: 29.07 KG/M2 | HEART RATE: 73 BPM | RESPIRATION RATE: 18 BRPM

## 2025-05-07 DIAGNOSIS — R73.03 PREDIABETES: ICD-10-CM

## 2025-05-07 DIAGNOSIS — M85.80 OSTEOPENIA, UNSPECIFIED LOCATION: Primary | ICD-10-CM

## 2025-05-07 DIAGNOSIS — E78.5 HYPERLIPIDEMIA LDL GOAL <100: ICD-10-CM

## 2025-05-07 DIAGNOSIS — Z82.49 FAMILY HISTORY OF BLOOD CLOTS: ICD-10-CM

## 2025-05-07 DIAGNOSIS — Z23 NEED FOR VACCINATION: ICD-10-CM

## 2025-05-07 PROCEDURE — 90471 IMMUNIZATION ADMIN: CPT | Performed by: INTERNAL MEDICINE

## 2025-05-07 PROCEDURE — 90677 PCV20 VACCINE IM: CPT | Performed by: INTERNAL MEDICINE

## 2025-05-07 PROCEDURE — 99203 OFFICE O/P NEW LOW 30 MIN: CPT | Mod: 25 | Performed by: INTERNAL MEDICINE

## 2025-05-07 ASSESSMENT — PAIN SCALES - GENERAL: PAINLEVEL_OUTOF10: NO PAIN (0)

## 2025-05-07 NOTE — PROGRESS NOTES
"  Assessment & Plan     Osteopenia, unspecified location  She takes calcium and vitamin D supplements for osteopenia.     Family history of blood clots  Father with factor 5 leiden mutation. Patietn was tested and negative    Prediabetes  - Hemoglobin A1c; Future    Hyperlipidemia LDL goal <100  The 10-year ASCVD risk score (Christina FENG, et al., 2019) is: 4.2%    Values used to calculate the score:      Age: 65 years      Sex: Female      Is Non- : No      Diabetic: No      Tobacco smoker: No      Systolic Blood Pressure: 113 mmHg      Is BP treated: No      HDL Cholesterol: 49 mg/dL      Total Cholesterol: 170 mg/dL  - Lipid Profile; Future    Need for vaccination  MMR   PCV20           BMI  Estimated body mass index is 28.89 kg/m  as calculated from the following:    Height as of this encounter: 1.555 m (5' 1.22\").    Weight as of this encounter: 69.9 kg (154 lb).   Weight management plan: Discussed healthy diet and exercise guidelines      Follow-up  Return in about 1 year (around 5/7/2026).    Giancarlo Landry is a 64 year old, presenting for the following health issues:  Establish Care and Imm/Inj (Questions regarding measles vaccine)      5/7/2025     4:02 PM   Additional Questions   Roomed by Shelbie ALEX MA     Imm/Inj  Tiffanie Castrejon is here to establish care.  She does not have any medical conditions    Fam hx: sister had ovarian cancer.   Patient has salpingo-oophorectomy due to abnormal imaging of the ovaries.   Biopsy was negative for cancer.    Nonsmoker  Etoh 1 drink per week.               Objective    /75 (BP Location: Left arm, Patient Position: Sitting, Cuff Size: Adult Regular)   Pulse 73   Temp 98.2  F (36.8  C) (Temporal)   Resp 18   Ht 1.555 m (5' 1.22\")   Wt 69.9 kg (154 lb)   SpO2 98%   BMI 28.89 kg/m    Body mass index is 28.89 kg/m .  Physical Exam           Signed Electronically by: Marj Mccall MD    "

## 2025-05-07 NOTE — PATIENT INSTRUCTIONS
"You can reduce your LDL cholesterol levels by eating less saturated fats.  This means you should eat less fried foods and meat.  If you eat meat, you should try to eat more chicken and fish and less beef or pork.  Also, you should increase dietary fiber intake by eating more fruits, vegetables and whole grains.  A diet high in fiber reduces LDL cholesterol levels.    Increasing exercise can improve HDL (\"good cholesterol\") levels.  A good target to shoot for is 30 minutes of aerobic activity at least 4 days per week.    You are due for mammogram.  Please call the following number to make appointment :  [phone]  It is located in [address]    "

## 2025-05-21 ENCOUNTER — LAB (OUTPATIENT)
Dept: LAB | Facility: CLINIC | Age: 65
End: 2025-05-21
Payer: MEDICARE

## 2025-05-21 DIAGNOSIS — E78.5 HYPERLIPIDEMIA LDL GOAL <100: ICD-10-CM

## 2025-05-21 DIAGNOSIS — R73.03 PREDIABETES: ICD-10-CM

## 2025-05-21 LAB
CHOLEST SERPL-MCNC: 208 MG/DL
EST. AVERAGE GLUCOSE BLD GHB EST-MCNC: 114 MG/DL
FASTING STATUS PATIENT QL REPORTED: YES
HBA1C MFR BLD: 5.6 % (ref 0–5.6)
HDLC SERPL-MCNC: 69 MG/DL
LDLC SERPL CALC-MCNC: 128 MG/DL
NONHDLC SERPL-MCNC: 139 MG/DL
TRIGL SERPL-MCNC: 55 MG/DL

## 2025-05-21 PROCEDURE — 80061 LIPID PANEL: CPT

## 2025-05-21 PROCEDURE — 83036 HEMOGLOBIN GLYCOSYLATED A1C: CPT

## 2025-05-21 PROCEDURE — 36415 COLL VENOUS BLD VENIPUNCTURE: CPT

## 2025-05-26 ENCOUNTER — RESULTS FOLLOW-UP (OUTPATIENT)
Dept: FAMILY MEDICINE | Facility: CLINIC | Age: 65
End: 2025-05-26

## 2025-06-01 LAB — NONINV COLON CA DNA+OCC BLD SCRN STL QL: POSITIVE

## 2025-06-02 ENCOUNTER — RESULTS FOLLOW-UP (OUTPATIENT)
Dept: GASTROENTEROLOGY | Facility: CLINIC | Age: 65
End: 2025-06-02
Payer: MEDICARE

## 2025-06-02 ENCOUNTER — TELEPHONE (OUTPATIENT)
Dept: GASTROENTEROLOGY | Facility: CLINIC | Age: 65
End: 2025-06-02
Payer: MEDICARE

## 2025-06-02 DIAGNOSIS — Z12.11 SPECIAL SCREENING FOR MALIGNANT NEOPLASMS, COLON: Primary | ICD-10-CM

## 2025-06-02 NOTE — TELEPHONE ENCOUNTER
"Endoscopy Scheduling Screen    Caller: patient    Have you had any respiratory illness or flu-like symptoms in the last 10 days?  No    What is your communication preference for Instructions and/or Bowel Prep?   MyChart    What insurance is in the chart?  Other:  Medicare/"[employer]    Ordering/Referring Provider:     CALIN MUNOZ      (If ordering provider performs procedure, schedule with ordering provider unless otherwise instructed. )    BMI: Estimated body mass index is 28.89 kg/m  as calculated from the following:    Height as of 5/7/25: 1.555 m (5' 1.22\").    Weight as of 5/7/25: 69.9 kg (154 lb).     Sedation Ordered  moderate sedation.   If patient BMI > 50 do not schedule in [facility].    If patient BMI > 45 do not schedule at [facility].    Are you taking methadone or Suboxone?  NO, No RN review required.    Have you been diagnosed and are being treated for severe PTSD or severe anxiety?  NO, No RN review required.    Are you taking any prescription medications for pain 3 or more times per week?   NO, No RN review required.    Do you have a history of malignant hyperthermia?  No    (Females) Are you currently pregnant?   No     Have you been diagnosed or told you have pulmonary hypertension?   No    Do you have an LVAD?  No    Have you been told you have moderate to severe sleep apnea?  No.    Have you been told you have COPD, asthma, or any other lung disease?  No    Has your doctor ordered any cardiac tests like echo, angiogram, stress test, ablation, or EKG, that you have not completed yet?  No    Do you  have a history of any heart conditions?  No     Have you ever had or are you waiting for an organ transplant?  No. Continue scheduling, no site restrictions.    Have you had a stroke or transient ischemic attack (TIA aka \"mini stroke\") in the last 2 years?   No.    Have you been diagnosed with or been told you have cirrhosis of the liver?   No.    Are you currently on dialysis?   No    Do you need assistance " "transferring?   No    BMI: Estimated body mass index is 28.89 kg/m  as calculated from the following:    Height as of 5/7/25: 1.555 m (5' 1.22\").    Weight as of 5/7/25: 69.9 kg (154 lb).     Is patients BMI > 40 and scheduling location [facility]?  No    Do you take an injectable or oral medication for weight loss or diabetes (excluding insulin)?  No    Do you take the medication Naltrexone?  No    Do you take blood thinners?  No       Prep   Are you currently on dialysis or do you have chronic kidney disease?  No    Do you have a diagnosis of diabetes?  No    Do you have a diagnosis of cystic fibrosis (CF)?  No    On a regular basis do you go 3 -5 days between bowel movements?  No    BMI > 40?  No    Preferred Pharmacy:    WRITTEN PRESCRIPTION REQUESTED  No address on file      [id number] - [place], [place] - [address] [place] [address]  Phone: [phone] Fax: [phone]      Final Scheduling Details     Procedure scheduled  Colonoscopy    Surgeon:  ann     Date of procedure:  6/18/25     Pre-OP / PAC:   No - Not required for this site.    Location  [facility] - Patient preference.    Sedation   Moderate Sedation - Per order.      Patient Reminders:   You will receive a call from a Nurse to review instructions and health history.  This assessment must be completed prior to your procedure.  Failure to complete the Nurse assessment may result in the procedure being cancelled.      On the day of your procedure, please designate an adult(s) who can drive you home stay with you for the next 24 hours. The medicines used in the exam will make you sleepy. You will not be able to drive.      You cannot take public transportation, ride share services, or non-medical taxi service without a responsible caregiver.  Medical transport services are allowed with the requirement that a responsible caregiver will receive you at your destination.  We require that drivers and caregivers are " confirmed prior to your procedure.

## 2025-06-03 ENCOUNTER — TELEPHONE (OUTPATIENT)
Dept: GASTROENTEROLOGY | Facility: CLINIC | Age: 65
End: 2025-06-03
Payer: MEDICARE

## 2025-06-03 DIAGNOSIS — R19.5 POSITIVE COLORECTAL CANCER SCREENING USING COLOGUARD TEST: ICD-10-CM

## 2025-06-03 DIAGNOSIS — Z12.11 ENCOUNTER FOR SCREENING COLONOSCOPY: Primary | ICD-10-CM

## 2025-06-03 NOTE — TELEPHONE ENCOUNTER
Pre visit planning completed.      Procedure details:    Patient scheduled for Colonoscopy on 6/18/25.     Arrival time: 0900. Procedure time 0945    Facility location: [facility]; [facility] [place] [address]. Check in location: [facility].     Sedation type: Conscious sedation     Pre op exam needed? No.    Indication for procedure: Screening, Positive Cologuard      Chart review:    Electronic implanted devices? No    Recent diagnosis of diverticulitis within the last 6 weeks? No      Medication review:    Diabetic? Prediabetic. Not on diabetic medications.    Anticoagulants? No    Weight loss medication/injectable? No GLP-1 medication per patient's medication list. Nursing to verify with pre-assessment call.    Other medication HOLDING recommendations:  If taking Fiber supplement HOLD 7 days before procedure - fiber supplements previously recommended by [facility].      Prep for procedure:    Bowel prep recommendation: Verify current bowel habits and use of laxatives/stool softeners  -  Standard Miralax vs Extended Golytely.   Due to: hx constipation noted or reported- hx constipation with hard pellet stools every 5-6 days per scanned [facility] records from 4/7/23.  If patient experiencing constipation or using laxatives/stool softeners, then would recommend Extended Golytely prep.     Procedure information and instructions were NOT sent - awaiting discussion with patient re: bowel habits/laxatives. Please send after review.    Preferred pharmacy for bowel prep scripts if needed:   [id number] - [place], [place] - [address] [place] [address]      Nancy Joe RN  Endoscopy Procedure Pre Assessment   [phone] option 3

## 2025-06-03 NOTE — TELEPHONE ENCOUNTER
Pre assessment completed for upcoming procedure.   (Please see previous telephone encounter notes for complete details)    Procedure details:    Procedure date 6/18/25, arrival time 0900 and facility location reviewed.    Pre op exam needed? No.    Designated  policy reviewed. Instructed to have someone stay 6  hours post procedure.       Medication review:    Medications reviewed. Please see supporting documentation below. Holding recommendations discussed (if applicable).       Prep for procedure:     Patient denies constipation. States they have regular bowel movements. Standard Miralax bowel prep instructions reviewed. Procedure details and Standard Miralax bowel prep instruction sent to patient via [employer].        Any additional information needed:  N/A      Patient verbalized understanding and had no questions or concerns at this time.      Dahlia Galo RN  Endoscopy Procedure Pre Assessment   [phone] option 3

## 2025-06-18 ENCOUNTER — HOSPITAL ENCOUNTER (OUTPATIENT)
Facility: CLINIC | Age: 65
Discharge: HOME | End: 2025-06-18
Attending: SURGERY | Admitting: SURGERY
Payer: MEDICARE

## 2025-06-18 VITALS
HEIGHT: 62 IN | DIASTOLIC BLOOD PRESSURE: 63 MMHG | SYSTOLIC BLOOD PRESSURE: 118 MMHG | HEART RATE: 84 BPM | OXYGEN SATURATION: 98 % | WEIGHT: 150 LBS | BODY MASS INDEX: 27.6 KG/M2 | RESPIRATION RATE: 45 BRPM

## 2025-06-18 LAB — COLONOSCOPY: NORMAL

## 2025-06-18 PROCEDURE — 88305 TISSUE EXAM BY PATHOLOGIST: CPT | Mod: TC | Performed by: SURGERY

## 2025-06-18 PROCEDURE — 99153 MOD SED SAME PHYS/QHP EA: CPT | Performed by: SURGERY

## 2025-06-18 PROCEDURE — 258N000003 HC RX IP 258 OP 636: Performed by: SURGERY

## 2025-06-18 PROCEDURE — 250N000011 HC RX IP 250 OP 636: Performed by: SURGERY

## 2025-06-18 PROCEDURE — G0500 MOD SEDAT ENDO SERVICE >5YRS: HCPCS | Mod: PT | Performed by: SURGERY

## 2025-06-18 PROCEDURE — 45380 COLONOSCOPY AND BIOPSY: CPT | Mod: PT | Performed by: SURGERY

## 2025-06-18 RX ORDER — FENTANYL CITRATE 50 UG/ML
INJECTION, SOLUTION INTRAMUSCULAR; INTRAVENOUS PRN
Status: DISCONTINUED | OUTPATIENT
Start: 2025-06-18 | End: 2025-06-18 | Stop reason: HOSPADM

## 2025-06-18 RX ORDER — ONDANSETRON 2 MG/ML
INJECTION INTRAMUSCULAR; INTRAVENOUS PRN
Status: DISCONTINUED | OUTPATIENT
Start: 2025-06-18 | End: 2025-06-18 | Stop reason: HOSPADM

## 2025-06-18 RX ORDER — LIDOCAINE 40 MG/G
CREAM TOPICAL
Status: DISCONTINUED | OUTPATIENT
Start: 2025-06-18 | End: 2025-06-18 | Stop reason: HOSPADM

## 2025-06-18 RX ORDER — ONDANSETRON 2 MG/ML
4 INJECTION INTRAMUSCULAR; INTRAVENOUS
Status: COMPLETED | OUTPATIENT
Start: 2025-06-18 | End: 2025-06-18

## 2025-06-18 RX ORDER — SODIUM CHLORIDE 9 MG/ML
INJECTION, SOLUTION INTRAVENOUS CONTINUOUS PRN
Status: DISCONTINUED | OUTPATIENT
Start: 2025-06-18 | End: 2025-06-18 | Stop reason: HOSPADM

## 2025-06-18 RX ADMIN — ONDANSETRON 4 MG: 2 INJECTION INTRAMUSCULAR; INTRAVENOUS at 11:40

## 2025-06-18 ASSESSMENT — ACTIVITIES OF DAILY LIVING (ADL)
ADLS_ACUITY_SCORE: 41

## 2025-06-18 NOTE — H&P
"Colonoscopy with Moderate Sedation   Pre-Procedure Evaluation  Updated If Indicated    Patient: Tiffanie Castrejon    MRN:    [id number]' Gender:  female     Age:   65 year old   :    1960          Preoperative Diagnosis: Screening for colon cancer [Z12.11]   Procedure(s):  Colonoscopy    Last colonoscopy:   Findings: normal    2025 positive cologuard    Family history of colon cancer: no  Family history: prostate, ovarian    Sedation at last colonoscopy:   unavailable       LABS:  CBC:   Lab Results   Component Value Date    WBC 7.4 2024    WBC 6.7 2023    HGB 13.2 2024    HGB 12.9 2023    HCT 40.0 2024    HCT 39.8 2023     2024     2023     BMP:   Lab Results   Component Value Date     2024    POTASSIUM 4.3 2024    CHLORIDE 107 2024    CO2 25 2024    BUN 16.2 2024    CR 0.82 2024    GLC 98 2024     COAGS: No results found for: \"PTT\", \"INR\", \"FIBR\"  POC:   No results found for: \"BGM\", \"HCG\", \"HCGS\"  OTHER:   Lab Results   Component Value Date    A1C 5.6 2025    LALITHA 10.0 2024    ALBUMIN 4.2 2024    PROTTOTAL 6.9 2024    ALT 35 2024    AST 23 2024    ALKPHOS 85 2024    BILITOTAL 0.7 2024    TSH 0.72 2024        Preop Vitals    BP Readings from Last 3 Encounters:   25 113/75   25 112/70   24 110/68    Pulse Readings from Last 3 Encounters:   25 73   23 72   23 60      Resp Readings from Last 3 Encounters:   25 18   23 20   23 12    SpO2 Readings from Last 3 Encounters:   25 98%   23 100%   23 97%      Temp Readings from Last 1 Encounters:   25 98.2  F (36.8  C) (Temporal)    Ht Readings from Last 1 Encounters:   25 1.555 m (5' 1.22\")      Wt Readings from Last 1 Encounters:   25 69.9 kg (154 lb)    Estimated body mass index is 28.89 kg/m  as calculated from " "the following:    Height as of 5/7/25: 1.555 m (5' 1.22\").    Weight as of 5/7/25: 69.9 kg (154 lb).       Past Medical History:   Diagnosis Date    Hot flashes, menopausal     Insomnia     2/2 stress and menopause    Osteopenia     Pelvic mass     Uterine fibroid       Past Surgical History:   Procedure Laterality Date    COLONOSCOPY      EYE SURGERY Bilateral 01/01/2014    lasik    LAPAROSCOPIC SALPINGO-OOPHORECTOMY Bilateral 3/16/2023    Procedure: LAPAROSCOPIC BILATERAL SALPINGO-OOPHORECTOMY;  Surgeon: Dianne Azar MD;  Location: [facility]    ORAL SURGERY IP CONSULT      SOFT TISSUE SURGERY Left 01/01/1992    lipoma on back      No Known Allergies     Heart: Regular rate and rhythma  Lungs:  Clear bilaterally    Plan- moderate sedation colonoscopy    "

## 2025-06-19 LAB
PATH REPORT.COMMENTS IMP SPEC: NORMAL
PATH REPORT.FINAL DX SPEC: NORMAL
PATH REPORT.GROSS SPEC: NORMAL
PATH REPORT.MICROSCOPIC SPEC OTHER STN: NORMAL
PATH REPORT.RELEVANT HX SPEC: NORMAL
PHOTO IMAGE: NORMAL

## 2025-06-19 PROCEDURE — 88305 TISSUE EXAM BY PATHOLOGIST: CPT | Mod: 26 | Performed by: PATHOLOGY

## 2025-07-15 ENCOUNTER — PATIENT OUTREACH (OUTPATIENT)
Dept: CARE COORDINATION | Facility: CLINIC | Age: 65
End: 2025-07-15
Payer: MEDICARE

## 2025-07-17 ENCOUNTER — PATIENT OUTREACH (OUTPATIENT)
Dept: CARE COORDINATION | Facility: CLINIC | Age: 65
End: 2025-07-17
Payer: MEDICARE

## 2025-08-11 ENCOUNTER — VIRTUAL VISIT (OUTPATIENT)
Dept: GASTROENTEROLOGY | Facility: CLINIC | Age: 65
End: 2025-08-11
Attending: INTERNAL MEDICINE
Payer: MEDICARE

## 2025-08-11 DIAGNOSIS — R93.3 ABNORMAL COLONOSCOPY: Primary | ICD-10-CM

## 2025-08-11 PROCEDURE — 1126F AMNT PAIN NOTED NONE PRSNT: CPT | Mod: 95 | Performed by: STUDENT IN AN ORGANIZED HEALTH CARE EDUCATION/TRAINING PROGRAM

## 2025-08-11 PROCEDURE — 98001 SYNCH AUDIO-VIDEO NEW LOW 30: CPT | Performed by: STUDENT IN AN ORGANIZED HEALTH CARE EDUCATION/TRAINING PROGRAM

## 2025-08-14 ENCOUNTER — TELEPHONE (OUTPATIENT)
Dept: GASTROENTEROLOGY | Facility: CLINIC | Age: 65
End: 2025-08-14
Payer: MEDICARE

## (undated) DEVICE — GLOVE BIOGEL PI MICRO SZ 6.5 48565

## (undated) DEVICE — PROTECTOR ARM ONE-STEP TRENDELENBURG 40418

## (undated) DEVICE — ENDO TROCAR FIRST ENTRY KII FIOS Z-THRD 11X100MM CTF33

## (undated) DEVICE — ENDO TROCAR FIRST ENTRY KII FIOS Z-THRD 05X100MM CTF03

## (undated) DEVICE — DEVICE SUTURE PASSER 14GA WECK EFX EFXSP2

## (undated) DEVICE — SOL WATER IRRIG 1000ML BOTTLE 2F7114

## (undated) DEVICE — SU VICRYL 4-0 PS-2 18" UND J496H

## (undated) DEVICE — ESU LIGASURE LAPAROSCOPIC BLUNT TIP SEALER 5MMX37CM LF1837

## (undated) DEVICE — KIT PATIENT POSITIONING PIGAZZI LATEX FREE 40580

## (undated) DEVICE — SOL NACL 0.9% INJ 1000ML BAG 2B1324X

## (undated) DEVICE — ESU GROUND PAD E7506

## (undated) DEVICE — SUCTION IRR STRYKERFLOW II W/TIP 250-070-520

## (undated) DEVICE — LINEN TOWEL PACK X5 5464

## (undated) DEVICE — ENDO POUCH UNIV RETRIEVAL SYSTEM INZII 10MM CD001

## (undated) DEVICE — SU VICRYL 0 UR-6 27" J603H

## (undated) DEVICE — BLADE CLIPPER SGL USE 9680

## (undated) DEVICE — NDL INSUFFLATION 13GA 120MM C2201

## (undated) DEVICE — BLADE KNIFE SURG 15 371115

## (undated) DEVICE — ENDO POUCH UNIVERSAL RETRIEVAL SYSTEM INZII 5MM CD003

## (undated) DEVICE — ENDO TROCAR SLEEVE KII Z-THREADED 05X100MM CTS02

## (undated) DEVICE — Device

## (undated) DEVICE — SOL ADH LIQUID BENZOIN SWAB 0.6ML C1544

## (undated) DEVICE — DRSG STERI STRIP 1/2X4" R1547

## (undated) DEVICE — SOL NACL 0.9% IRRIG 1000ML BOTTLE 2F7124

## (undated) RX ORDER — FENTANYL CITRATE 50 UG/ML
INJECTION, SOLUTION INTRAMUSCULAR; INTRAVENOUS
Status: DISPENSED
Start: 2025-06-18

## (undated) RX ORDER — ACETAMINOPHEN 325 MG/1
TABLET ORAL
Status: DISPENSED
Start: 2023-03-16

## (undated) RX ORDER — FENTANYL CITRATE 50 UG/ML
INJECTION, SOLUTION INTRAMUSCULAR; INTRAVENOUS
Status: DISPENSED
Start: 2023-03-16

## (undated) RX ORDER — KETOROLAC TROMETHAMINE 30 MG/ML
INJECTION, SOLUTION INTRAMUSCULAR; INTRAVENOUS
Status: DISPENSED
Start: 2023-03-16

## (undated) RX ORDER — DEXAMETHASONE SODIUM PHOSPHATE 4 MG/ML
INJECTION, SOLUTION INTRA-ARTICULAR; INTRALESIONAL; INTRAMUSCULAR; INTRAVENOUS; SOFT TISSUE
Status: DISPENSED
Start: 2023-03-16

## (undated) RX ORDER — ONDANSETRON 2 MG/ML
INJECTION INTRAMUSCULAR; INTRAVENOUS
Status: DISPENSED
Start: 2025-06-18

## (undated) RX ORDER — PROPOFOL 10 MG/ML
INJECTION, EMULSION INTRAVENOUS
Status: DISPENSED
Start: 2023-03-16

## (undated) RX ORDER — HYDROMORPHONE HYDROCHLORIDE 1 MG/ML
INJECTION, SOLUTION INTRAMUSCULAR; INTRAVENOUS; SUBCUTANEOUS
Status: DISPENSED
Start: 2023-03-16

## (undated) RX ORDER — GLYCOPYRROLATE 0.2 MG/ML
INJECTION, SOLUTION INTRAMUSCULAR; INTRAVENOUS
Status: DISPENSED
Start: 2023-03-16

## (undated) RX ORDER — ONDANSETRON 2 MG/ML
INJECTION INTRAMUSCULAR; INTRAVENOUS
Status: DISPENSED
Start: 2023-03-16

## (undated) RX ORDER — NEOSTIGMINE METHYLSULFATE 1 MG/ML
VIAL (ML) INJECTION
Status: DISPENSED
Start: 2023-03-16

## (undated) RX ORDER — CEFAZOLIN SODIUM/WATER 2 G/20 ML
SYRINGE (ML) INTRAVENOUS
Status: DISPENSED
Start: 2023-03-16

## (undated) RX ORDER — BUPIVACAINE HYDROCHLORIDE 2.5 MG/ML
INJECTION, SOLUTION EPIDURAL; INFILTRATION; INTRACAUDAL
Status: DISPENSED
Start: 2023-03-16